# Patient Record
Sex: MALE | Race: BLACK OR AFRICAN AMERICAN
[De-identification: names, ages, dates, MRNs, and addresses within clinical notes are randomized per-mention and may not be internally consistent; named-entity substitution may affect disease eponyms.]

---

## 2017-05-19 ENCOUNTER — HOSPITAL ENCOUNTER (OUTPATIENT)
Dept: HOSPITAL 11 - JP.SDS | Age: 57
Discharge: HOME | End: 2017-05-19
Attending: SURGERY
Payer: MEDICAID

## 2017-05-19 VITALS — SYSTOLIC BLOOD PRESSURE: 131 MMHG | DIASTOLIC BLOOD PRESSURE: 97 MMHG

## 2017-05-19 DIAGNOSIS — Z12.11: Primary | ICD-10-CM

## 2017-05-19 PROCEDURE — 45378 DIAGNOSTIC COLONOSCOPY: CPT

## 2017-05-19 NOTE — OR
DATE OF PROCEDURE:  05/19/2017

 

PREOPERATIVE DIAGNOSIS:  Colon cancer screening.

 

POSTOPERATIVE DIAGNOSIS:  Unremarkable colonoscopy.

 

PROCEDURE:  Colonoscopy to the cecum.

 

ANESTHESIA:  IV anesthesia with monitored anesthesia care.

 

INDICATION:  This 57-year-old black male is referred for a colonoscopy for 
colon cancer

screening.  He has never had a colonoscopic exam.  I counseled him for the 
procedure

including risks and alternatives and he gave his informed consent to proceed.

 

DESCRIPTION OF OPERATION:  The patient was placed in the left lateral decubitus 
position.

IV anesthesia was administered by the Anesthesia Service.  Time-out was held.  
A rectal 

exam was performed, which was unremarkable.  The flexible video Olympus 
colonoscope was

introduced through his anus, up his rectum, and out his colon all way to the 
cecum.  Once

the cecum was reached, the scope was slowly withdrawn examining the mucosa 
throughout.

No mucosal abnormalities were noted.  The scope was retroflexed in the rectum 
with the

distal rectum appearing unremarkable.  The scope was straightened and removed.  
He tolerated

the procedure well.

 

 

 

 

Carlos Salgado MD

DD:  05/19/2017 08:05:14

DT:  05/19/2017 10:45:22

Job #:  [id number]

[facility]

## 2018-06-06 ENCOUNTER — HOSPITAL ENCOUNTER (EMERGENCY)
Dept: HOSPITAL 11 - JP.ED | Age: 58
Discharge: HOME | End: 2018-06-06
Payer: MEDICAID

## 2018-06-06 VITALS — SYSTOLIC BLOOD PRESSURE: 124 MMHG | DIASTOLIC BLOOD PRESSURE: 76 MMHG

## 2018-06-06 DIAGNOSIS — Z79.899: ICD-10-CM

## 2018-06-06 DIAGNOSIS — E87.6: ICD-10-CM

## 2018-06-06 DIAGNOSIS — I20.9: Primary | ICD-10-CM

## 2018-06-06 DIAGNOSIS — Z87.891: ICD-10-CM

## 2018-06-06 DIAGNOSIS — I10: ICD-10-CM

## 2018-06-06 PROCEDURE — 83735 ASSAY OF MAGNESIUM: CPT

## 2018-06-06 PROCEDURE — 84484 ASSAY OF TROPONIN QUANT: CPT

## 2018-06-06 PROCEDURE — 93005 ELECTROCARDIOGRAM TRACING: CPT

## 2018-06-06 PROCEDURE — 36415 COLL VENOUS BLD VENIPUNCTURE: CPT

## 2018-06-06 PROCEDURE — 80048 BASIC METABOLIC PNL TOTAL CA: CPT

## 2018-06-06 PROCEDURE — 99285 EMERGENCY DEPT VISIT HI MDM: CPT

## 2018-06-06 PROCEDURE — 85027 COMPLETE CBC AUTOMATED: CPT

## 2018-06-06 NOTE — EDM.PDOC
ED HPI GENERAL MEDICAL PROBLEM





- General


Chief Complaint: Chest Pain


Stated Complaint: MEDICAL VIA NORTH


Time Seen by Provider: 06/06/18 11:15


Source of Information: Reports: Patient, EMS, Old Records, RN


History Limitations: Reports: No Limitations





- History of Present Illness


INITIAL COMMENTS - FREE TEXT/NARRATIVE: 





57 yo male with known CAD presents after he took NTG for CP and became syncopal 

afterwards. Was sitting at the table when he took the Nitro and when the CP 

came on. His last episode of CP was 2 days ago and occurred while lying in bed, 

at that time he also took NTG in the lying position and did not pass out. He 

arrives via EMS today and they had attempted to walk him out to the ambulance 

upon their arrival and he passed out again with a BP of 80 systolic. 





Now in the ER he is feeling back to his baseline. He has taken all his morning 

meds including his aspirin. 





His cardiologist is in [place], [place]. He has been experiencing increased frequency 

of CP over the past couple of mos. 


Onset: Today


Onset Date: 06/06/18


Onset Time: 10:40


Duration: Minutes:, Resolved Prior to Arrival


Location: Reports: Chest


Quality: Reports: Other (tightness)


Severity: Moderate


Improves with: Reports: Medication


Worsens with: Reports: Other (unknown)


Context: Reports: Other (Has known CAD)


Associated Symptoms: Reports: Syncope (only after taking his NTG).  Denies: 

Diaphoresis, Fever/Chills, Nausea/Vomiting, Shortness of Breath


Treatments PTA: Reports: Other (see below) (NTG)





- Related Data


 Allergies











Allergy/AdvReac Type Severity Reaction Status Date / Time


 


No Known Allergies Allergy   Verified 06/06/18 11:14











Home Meds: 


 Home Meds





Metoprolol Succinate [Toprol XL] 100 mg PO BID 01/17/14 [History]


Nitroglycerin [Nitrostat] 0.4 mg SL ASDIRECTED PRN 01/17/14 [History]


Simvastatin [Zocor] 40 mg PO DAILY 01/17/14 [History]


risperiDONE [RisperiDAL] 0.5 mg PO DAILY 01/17/14 [History]


Mirtazapine [Remeron] 30 mg PO BEDTIME 09/17/14 [History]


Furosemide [Lasix] 40 mg PO DAILY 02/28/15 [History]


Isosorbide Mononitrate [Isosorbide Mononitrate ER] 120 mg PO DAILY 02/28/15 [

History]


LORazepam [Ativan] 0.5 - 1 mg PO DAILY PRN 02/28/15 [History]


Vilazodone [Viibryd] 40 mg PO DAILY 02/28/15 [History]


Aspirin [Children's Aspirin] 81 mg PO DAILY 08/09/16 [History]


Albuterol Sulfate [Proair Hfa] 2 puff IH Q4H PRN 05/18/17 [History]


Carbidopa/Levodopa [Sinemet  mg Tablet] 1 each PO QID 05/18/17 [History]


Eszopiclone [Lunesta] 2 mg PO BEDTIME 05/18/17 [History]


Lisinopril [Prinivil] 0.5 tab PO DAILY 05/18/17 [History]


oxyCODONE HCl [Roxicodone] 5 mg PO Q6H PRN 05/18/17 [History]


Potassium Chloride 10 meq PO TID #20 cap.er 06/06/18 [Rx]











Past Medical History


HEENT History: Reports: Impaired Vision


Other HEENT History: prosthetic eye right


Cardiovascular History: Reports: CAD, High Cholesterol, Hypertension, MI, 

Pacemaker, SOB on Exertion, Stents


Gastrointestinal History: Reports: GERD


Neurological History: Reports: CVA, Parkinson's


Psychiatric History: Reports: Anxiety, Bipolar, Depression, Mood Swings, PTSD, 

Suicide Attempt





- Infectious Disease History


Infectious Disease History: Reports: Chicken Pox, Measles


Other Infectious Disease History: unable to obtain





- Past Surgical History


HEENT Surgical History: Reports: Eye Surgery


Cardiovascular Surgical History: Reports: Coronary Artery Stent, Other (See 

Below)


Other Cardiovascular Surgeries/Procedures: pacer defibrillator


GI Surgical History: Reports: EGD, Hernia, Abdominal





Social & Family History





- Tobacco Use


Smoking Status *Q: Former Smoker


Used Tobacco, but Quit: Yes


Month/Year Tobacco Last Used: 0





- Caffeine Use


Caffeine Use: Reports: None





- Recreational Drug Use


Recreational Drug Use: No





ED ROS GENERAL





- Review of Systems


Review Of Systems: See Below


Constitutional: Reports: No Symptoms


HEENT: Reports: No Symptoms


Respiratory: Reports: No Symptoms


Cardiovascular: Reports: Chest Pain, Lightheadedness, Syncope


Endocrine: Reports: No Symptoms


GI/Abdominal: Reports: No Symptoms


: Reports: No Symptoms


Musculoskeletal: Reports: No Symptoms


Skin: Reports: No Symptoms


Neurological: Reports: No Symptoms


Psychiatric: Reports: No Symptoms





ED EXAM, GENERAL





- Physical Exam


Exam: See Below


Exam Limited By: No Limitations


General Appearance: Alert, WD/WN, No Apparent Distress


Eye Exam: Bilateral Eye: Normal Inspection


Ears: Normal External Exam, Normal Canal, Hearing Grossly Normal


Ear Exam: Bilateral Ear: Auricle Normal, Canal Normal


Nose: Normal Inspection, Normal Mucosa, No Blood


Throat/Mouth: Normal Inspection, Normal Lips, Normal Oropharynx, Normal Voice, 

No Airway Compromise


Head: Atraumatic, Normocephalic


Neck: Normal Inspection, Supple, Non-Tender


Respiratory/Chest: No Respiratory Distress, Lungs Clear, Normal Breath Sounds, 

No Accessory Muscle Use


Cardiovascular: Regular Rate, Rhythm, No Edema


GI/Abdominal: Normal Bowel Sounds, Soft, Non-Tender, No Distention


Back Exam: Normal Inspection


Extremities: Normal Inspection, Normal Range of Motion, Non-Tender, No Pedal 

Edema


Neurological: Alert, Oriented, CN II-XII Intact, Normal Cognition, No Motor/

Sensory Deficits


Psychiatric: Normal Affect, Normal Mood


Skin Exam: Warm, Dry, Intact, Normal Color, No Rash


Lymphatic: No Adenopathy





EKG INTERPRETATION


EKG Date: 06/06/18


Time: 11:10


Rhythm: NSR


Rate (Beats/Min): 75


Axis: Normal


P-Wave: Present


QRS: Normal


ST-T: Normal


QT: Normal


Comparison: No Change





Course





- Vital Signs


Last Recorded V/S: 


 Last Vital Signs











Temp  35.1 C L  06/06/18 11:12


 


Pulse  73   06/06/18 11:43


 


Resp  16   06/06/18 11:43


 


BP  124/76   06/06/18 11:43


 


Pulse Ox  90 L  06/06/18 11:43














- Orders/Labs/Meds


Orders: 


 Active Orders 24 hr











 Category Date Time Status


 


 Cardiac Monitoring [RC] .As Directed Care  06/06/18 11:20 Active


 


 EKG Documentation Completion [RC] ASDIRECTED Care  06/06/18 11:19 Active


 


 EKG 12 Lead [EK] Routine Ther  06/06/18 11:19 Ordered











Labs: 


 Laboratory Tests











  06/06/18 06/06/18 06/06/18 Range/Units





  11:38 11:38 12:27 


 


WBC  7.2    (4.5-11.0)  K/uL


 


RBC  4.31    (4.30-5.90)  M/uL


 


Hgb  13.4    (12.0-15.0)  g/dL


 


Hct  40.9    (40.0-54.0)  %


 


MCV  95    (80-98)  fL


 


MCH  31    (27-31)  pg


 


MCHC  33    (32-36)  %


 


Plt Count  163    (150-400)  K/uL


 


Sodium   140   (140-148)  mmol/L


 


Potassium   3.1 L   (3.6-5.2)  mmol/L


 


Chloride   102   (100-108)  mmol/L


 


Carbon Dioxide   28   (21-32)  mmol/L


 


Anion Gap   13.1   (5.0-14.0)  mmol/L


 


BUN   6 L   (7-18)  mg/dL


 


Creatinine   1.5 H   (0.8-1.3)  mg/dL


 


Est Cr Clr Drug Dosing   49.62   mL/min


 


Estimated GFR (MDRD)   59 L   (>60)  


 


Glucose   127 H   ()  mg/dL


 


Calcium   8.5   (8.5-10.1)  mg/dL


 


Magnesium    1.7 L  (1.8-2.4)  mg/dL


 


Troponin I   0.048   (0.000-0.056)  ng/mL











Meds: 


Medications














Discontinued Medications














Generic Name Dose Route Start Last Admin





  Trade Name Freq  PRN Reason Stop Dose Admin


 


Potassium Chloride  40 meq  06/06/18 12:26  





  Potassium Chloride  PO  06/06/18 12:27  





  ONETIME ONE   





     





     





     





     














Departure





- Departure


Time of Disposition: 13:00


Disposition: Home, Self-Care 01


Condition: Fair


Clinical Impression: 


 Angina at rest, Hypokalemia





Referrals: 


Ezequiel Piña MD [Primary Care Provider] - 


Forms:  ED Department Discharge





- My Orders


Last 24 Hours: 


My Active Orders





06/06/18 11:19


EKG Documentation Completion [RC] ASDIRECTED 


EKG 12 Lead [EK] Routine 





06/06/18 11:20


Cardiac Monitoring [RC] .As Directed 














- Assessment/Plan


Last 24 Hours: 


My Active Orders





06/06/18 11:19


EKG Documentation Completion [RC] ASDIRECTED 


EKG 12 Lead [EK] Routine 





06/06/18 11:20


Cardiac Monitoring [RC] .As Directed

## 2018-08-14 ENCOUNTER — HOSPITAL ENCOUNTER (EMERGENCY)
Dept: HOSPITAL 11 - JP.ED | Age: 58
Discharge: HOME | End: 2018-08-14
Payer: MEDICAID

## 2018-08-14 VITALS — SYSTOLIC BLOOD PRESSURE: 98 MMHG | DIASTOLIC BLOOD PRESSURE: 64 MMHG

## 2018-08-14 DIAGNOSIS — I10: ICD-10-CM

## 2018-08-14 DIAGNOSIS — F41.9: ICD-10-CM

## 2018-08-14 DIAGNOSIS — Z79.899: ICD-10-CM

## 2018-08-14 DIAGNOSIS — R40.0: Primary | ICD-10-CM

## 2018-08-14 NOTE — EDM.PDOC
ED HPI GENERAL MEDICAL PROBLEM





- General


Chief Complaint: General


Stated Complaint: OFF BALANCE, LIGHT HEADED


Time Seen by Provider: 08/14/18 16:50


Source of Information: Reports: Patient, Family


History Limitations: Reports: Altered Mental Status, Physical Impairment





- History of Present Illness


INITIAL COMMENTS - FREE TEXT/NARRATIVE: 





58-year-old male for the past 2 hours has been very sleepy, lightheaded, feels 

his balance is off so his family brought him in to be evaluated. He walks but 

has to be helped. When you don't talk to him he tends to fall asleep. He does 

wake up to voice, answers questions appropriately. Denies any pain or shortness 

of breath. Denies nausea or vomiting or recent illness, no fevers or chills. 

There were some concerns that there may have been some medication errors but 

now they're saying that it looks like his medications are taken appropriately.





- Related Data


 Allergies











Allergy/AdvReac Type Severity Reaction Status Date / Time


 


No Known Allergies Allergy   Verified 08/14/18 16:42











Home Meds: 


 Home Meds





Mirtazapine [Remeron] 15 mg PO BEDTIME 09/17/14 [History]


Isosorbide Mononitrate [Isosorbide Mononitrate ER] 120 mg PO DAILY 02/28/15 [

History]


Carbidopa/Levodopa [Sinemet  mg Tablet] 1 tab PO QID 05/18/17 [History]


Prazosin [Minpress] 2 mg PO BID 06/20/18 [History]


QUEtiapine [SEROquel XR] 200 mg PO BEDTIME 06/20/18 [History]


atorvaSTATin [Lipitor] 40 mg PO DAILY 06/20/18 [History]


clonazePAM [Klonopin] 1 mg PO BEDTIME 06/20/18 [History]


Calcium Carbonate [Calcium] 1,200 mg PO DAILY 08/14/18 [History]


Meclizine [Antivert] 1 tab PO TID PRN 08/14/18 [History]


clonazePAM [Klonopin] 1 tab PO DAILY PRN 08/14/18 [History]


lamoTRIgine 1 tab PO DAILY 08/14/18 [History]











Past Medical History


HEENT History: Reports: Impaired Vision


Other HEENT History: prosthetic eye right


Cardiovascular History: Reports: CAD, High Cholesterol, Hypertension, MI, 

Pacemaker, SOB on Exertion, Stents


Gastrointestinal History: Reports: GERD


Neurological History: Reports: CVA, Parkinson's


Psychiatric History: Reports: Anxiety, Bipolar, Depression, Mood Swings, PTSD, 

Suicide Attempt





- Infectious Disease History


Infectious Disease History: Reports: Chicken Pox, Measles


Other Infectious Disease History: unable to obtain





- Past Surgical History


HEENT Surgical History: Reports: Eye Surgery


Cardiovascular Surgical History: Reports: Coronary Artery Stent, Other (See 

Below)


Other Cardiovascular Surgeries/Procedures: pacer defibrillator


GI Surgical History: Reports: EGD, Hernia, Abdominal





Social & Family History





- Tobacco Use


Smoking Status *Q: Never Smoker





- Caffeine Use


Caffeine Use: Reports: None





- Recreational Drug Use


Recreational Drug Use: No





ED ROS GENERAL





- Review of Systems


Review Of Systems: See Below


Constitutional: Denies: Fever, Chills, Malaise


HEENT: Reports: Other (Patient has a prosthetic eye, no new visual problems)


Respiratory: Denies: Shortness of Breath, Cough


Cardiovascular: Denies: Chest Pain, Palpitations


Endocrine: Reports: Fatigue


GI/Abdominal: Denies: Abdominal Pain, Nausea, Vomiting


: Reports: No Symptoms


Neurological: Reports: Dizziness, Difficulty Walking, Weakness.  Denies: 

Headache





ED EXAM, GENERAL





- Physical Exam


Exam: See Below


Exam Limited By: No Limitations


General Appearance: Lethargic (Patient is acting very tired but is arousable 

and answering questions)


Eye Exam: Bilateral Eye: PERRL (His non-prosthetic eye is reactive to light)


Ear Exam: Bilateral Ear: TM normal


Head: Atraumatic, Other


Respiratory/Chest: No Respiratory Distress, Lungs Clear


Cardiovascular: Regular Rate, Rhythm


GI/Abdominal: Soft, Tender (Somewhat generally tender to palpation of the 

abdomen, no focal guarding or rebound)


Neurological: Oriented (Patient is oriented to time and place)


Psychiatric: Depressed Mood, Flat Affect


Skin Exam: Warm, Dry





Course





- Vital Signs


Last Recorded V/S: 


 Last Vital Signs











Temp  96.8 F   08/14/18 16:41


 


Pulse  84   08/14/18 17:37


 


Resp  16   08/14/18 16:41


 


BP  98/64   08/14/18 17:37


 


Pulse Ox  92 L  08/14/18 17:37














- Orders/Labs/Meds


Orders: 


 Active Orders 24 hr











 Category Date Time Status


 


 Head wo Cont [CT] Stat Exams  08/14/18 17:08 Taken


 


 DRUG SCREEN, URINE [URCHEM] Stat Lab  08/14/18 17:52 Ordered


 


 UA W/MICROSCOPIC [URIN] Urgent Lab  08/14/18 17:52 Ordered











Labs: 


 Laboratory Tests











  08/14/18 08/14/18 08/14/18 Range/Units





  17:08 17:08 17:52 


 


WBC  6.4    (4.5-11.0)  K/uL


 


RBC  4.23 L    (4.30-5.90)  M/uL


 


Hgb  12.8    (12.0-15.0)  g/dL


 


Hct  39.4 L    (40.0-54.0)  %


 


MCV  93    (80-98)  fL


 


MCH  30    (27-31)  pg


 


MCHC  33    (32-36)  %


 


Plt Count  169    (150-400)  K/uL


 


Neut % (Auto)  61    (36-66)  %


 


Lymph % (Auto)  27    (24-44)  %


 


Mono % (Auto)  8 H    (2-6)  %


 


Eos % (Auto)  4    (2-4)  %


 


Baso % (Auto)  0    (0-1)  %


 


Sodium   141   (140-148)  mmol/L


 


Potassium   3.4 L   (3.6-5.2)  mmol/L


 


Chloride   105   (100-108)  mmol/L


 


Carbon Dioxide   30   (21-32)  mmol/L


 


Anion Gap   9.4   (5.0-14.0)  mmol/L


 


BUN   11  D   (7-18)  mg/dL


 


Creatinine   1.3   (0.8-1.3)  mg/dL


 


Est Cr Clr Drug Dosing   57.26   mL/min


 


Estimated GFR (MDRD)   > 60   (>60)  


 


Glucose   111 H   ()  mg/dL


 


Calcium   9.0   (8.5-10.1)  mg/dL


 


Total Bilirubin   0.8   (0.2-1.0)  mg/dL


 


AST   22   (15-37)  U/L


 


ALT   17   (12-78)  U/L


 


Alkaline Phosphatase   119 H   ()  U/L


 


Total Protein   7.1   (6.4-8.2)  g/dL


 


Albumin   3.4   (3.4-5.0)  g/dL


 


Globulin   3.7 H   (2.3-3.5)  g/dL


 


Albumin/Globulin Ratio   0.9 L   (1.2-2.2)  


 


Urine Color    Orange  


 


Urine Appearance    Clear  


 


Urine pH    5.0  (4.5-8.0)  


 


Ur Specific Gravity    1.030  (1.008-1.030)  


 


Urine Protein    Negative  (NEGATIVE)  mg/dL


 


Urine Glucose (UA)    Normal  (NEGATIVE)  mg/dL


 


Urine Ketones    Negative  (NEGATIVE)  mg/dL


 


Urine Occult Blood    Negative  (NEGATIVE)  


 


Urine Nitrite    Negative  (NEGAITVE)  


 


Urine Bilirubin    Negative  (NEGATIVE)  


 


Urine Urobilinogen    Normal  (NORMAL)  mg/dL


 


Ur Leukocyte Esterase    Negative  (NEGATIVE)  


 


Urine RBC    0-5  (0-5)  


 


Urine WBC    0-5  (0-5)  


 


Ur Epithelial Cells    Not seen  


 


Amorphous Sediment    Few  


 


Urine Bacteria    Not seen  


 


Urine Mucus    Not seen  


 


Urine Opiates Screen     (NEGATIVE)  


 


Ur Oxycodone Screen     (NEGATIVE)  


 


Urine Methadone Screen     (NEGATIVE)  


 


Ur Propoxyphene Screen     (NEGATIVE)  


 


Ur Barbiturates Screen     (NEGATIVE)  


 


Ur Tricyclics Screen     (NEGATIVE)  


 


Ur Phencyclidine Scrn     (NEGATIVE)  


 


Ur Amphetamine Screen     (NEGATIVE)  


 


U Methamphetamines Scrn     (NEGATIVE)  


 


Urine MDMA Screen     (NEGATIVE)  


 


U Benzodiazepines Scrn     (NEGATIVE)  


 


U Cocaine Metab Screen     (NEGATIVE)  


 


U Marijuana (THC) Screen     (NEGATIVE)  














  08/14/18 Range/Units





  17:52 


 


WBC   (4.5-11.0)  K/uL


 


RBC   (4.30-5.90)  M/uL


 


Hgb   (12.0-15.0)  g/dL


 


Hct   (40.0-54.0)  %


 


MCV   (80-98)  fL


 


MCH   (27-31)  pg


 


MCHC   (32-36)  %


 


Plt Count   (150-400)  K/uL


 


Neut % (Auto)   (36-66)  %


 


Lymph % (Auto)   (24-44)  %


 


Mono % (Auto)   (2-6)  %


 


Eos % (Auto)   (2-4)  %


 


Baso % (Auto)   (0-1)  %


 


Sodium   (140-148)  mmol/L


 


Potassium   (3.6-5.2)  mmol/L


 


Chloride   (100-108)  mmol/L


 


Carbon Dioxide   (21-32)  mmol/L


 


Anion Gap   (5.0-14.0)  mmol/L


 


BUN   (7-18)  mg/dL


 


Creatinine   (0.8-1.3)  mg/dL


 


Est Cr Clr Drug Dosing   mL/min


 


Estimated GFR (MDRD)   (>60)  


 


Glucose   ()  mg/dL


 


Calcium   (8.5-10.1)  mg/dL


 


Total Bilirubin   (0.2-1.0)  mg/dL


 


AST   (15-37)  U/L


 


ALT   (12-78)  U/L


 


Alkaline Phosphatase   ()  U/L


 


Total Protein   (6.4-8.2)  g/dL


 


Albumin   (3.4-5.0)  g/dL


 


Globulin   (2.3-3.5)  g/dL


 


Albumin/Globulin Ratio   (1.2-2.2)  


 


Urine Color   


 


Urine Appearance   


 


Urine pH   (4.5-8.0)  


 


Ur Specific Gravity   (1.008-1.030)  


 


Urine Protein   (NEGATIVE)  mg/dL


 


Urine Glucose (UA)   (NEGATIVE)  mg/dL


 


Urine Ketones   (NEGATIVE)  mg/dL


 


Urine Occult Blood   (NEGATIVE)  


 


Urine Nitrite   (NEGAITVE)  


 


Urine Bilirubin   (NEGATIVE)  


 


Urine Urobilinogen   (NORMAL)  mg/dL


 


Ur Leukocyte Esterase   (NEGATIVE)  


 


Urine RBC   (0-5)  


 


Urine WBC   (0-5)  


 


Ur Epithelial Cells   


 


Amorphous Sediment   


 


Urine Bacteria   


 


Urine Mucus   


 


Urine Opiates Screen  Negative  (NEGATIVE)  


 


Ur Oxycodone Screen  Negative  (NEGATIVE)  


 


Urine Methadone Screen  Negative  (NEGATIVE)  


 


Ur Propoxyphene Screen  Negative  (NEGATIVE)  


 


Ur Barbiturates Screen  Negative  (NEGATIVE)  


 


Ur Tricyclics Screen  Presumptive positive H  (NEGATIVE)  


 


Ur Phencyclidine Scrn  Negative  (NEGATIVE)  


 


Ur Amphetamine Screen  Negative  (NEGATIVE)  


 


U Methamphetamines Scrn  Negative  (NEGATIVE)  


 


Urine MDMA Screen  Negative  (NEGATIVE)  


 


U Benzodiazepines Scrn  Negative  (NEGATIVE)  


 


U Cocaine Metab Screen  Negative  (NEGATIVE)  


 


U Marijuana (THC) Screen  Negative  (NEGATIVE)  














- Re-Assessments/Exams


Free Text/Narrative Re-Assessment/Exam: 





08/14/18 18:15


CBC and CMP were obtained and were reassuring. Head CT was obtained and was 

also normal. Had a long discussion with the family, his mental status continued 

to markedly improve while he was here. I think is a combination medication side 

effects with overlying anxiety or conversion reaction.


08/14/18 18:16


UA returned negative, and drug screen was negative as well. Patient has 

appointment with his psychiatrist tomorrow, possibly reducing the Seroquel 

doses may be helpful.





Departure





- Departure


Time of Disposition: 18:33


Disposition: Home, Self-Care 01


Condition: Fair


Clinical Impression: 


 Anxiety





Change in mental status


Qualifiers:


 Altered mental status type: somnolence Qualified Code(s): R40.0 - Somnolence








- Discharge Information


Instructions:  Weakness


Referrals: 


Ezequiel Piña MD [Primary Care Provider] - 


Forms:  ED Department Discharge


Care Plan Goals: 


Keep appointment with your psychiatrist tomorrow to discuss medications. 

Recheck tomorrow at the ER or with your medical doctor if not improving 

satisfactorily.





- My Orders


Last 24 Hours: 


My Active Orders





08/14/18 17:08


Head wo Cont [CT] Stat 





08/14/18 17:52


DRUG SCREEN, URINE [URCHEM] Stat 


UA W/MICROSCOPIC [URIN] Urgent 














- Assessment/Plan


Last 24 Hours: 


My Active Orders





08/14/18 17:08


Head wo Cont [CT] Stat 





08/14/18 17:52


DRUG SCREEN, URINE [URCHEM] Stat 


UA W/MICROSCOPIC [URIN] Urgent

## 2018-08-15 ENCOUNTER — HOSPITAL ENCOUNTER (EMERGENCY)
Dept: HOSPITAL 11 - JP.ED | Age: 58
Discharge: HOME | End: 2018-08-15
Payer: MEDICAID

## 2018-08-15 VITALS — SYSTOLIC BLOOD PRESSURE: 124 MMHG | DIASTOLIC BLOOD PRESSURE: 81 MMHG

## 2018-08-15 DIAGNOSIS — R41.0: Primary | ICD-10-CM

## 2018-08-15 DIAGNOSIS — Z79.899: ICD-10-CM

## 2018-08-15 DIAGNOSIS — I10: ICD-10-CM

## 2018-08-15 NOTE — EDM.PDOC
ED HPI GENERAL MEDICAL PROBLEM





- General


Chief Complaint: General


Stated Complaint: ILLNESS


Time Seen by Provider: 08/15/18 17:19


Source of Information: Reports: Patient, Family, Old Records, RN Notes Reviewed


History Limitations: Reports: No Limitations





- History of Present Illness


INITIAL COMMENTS - FREE TEXT/NARRATIVE: 





58-year-old gentleman presents emergency department today with the same 

complaints that he had yesterday which include somnolence, dizziness, 

difficulty with concentration, shakiness difficulty with speech difficulty 

swallowing, blurred vision fatigue and irritability, he had an extensive workup 

yesterday blood work and CAT scan which did not reveal any concern the 

attending physician felt it may be related to his Seroquel, he was to have an 

appointment today with his mental health provider unfortunately the clinic 

canceled the appointment and it is rescheduled for tomorrow.





- Related Data


 Allergies











Allergy/AdvReac Type Severity Reaction Status Date / Time


 


No Known Allergies Allergy   Verified 08/15/18 17:11











Home Meds: 


 Home Meds





Mirtazapine [Remeron] 15 mg PO BEDTIME 09/17/14 [History]


Isosorbide Mononitrate [Isosorbide Mononitrate ER] 120 mg PO DAILY 02/28/15 [

History]


Carbidopa/Levodopa [Sinemet  mg Tablet] 1 tab PO QID 05/18/17 [History]


Prazosin [Minpress] 2 mg PO BID 06/20/18 [History]


QUEtiapine [SEROquel XR] 200 mg PO BEDTIME 06/20/18 [History]


atorvaSTATin [Lipitor] 40 mg PO DAILY 06/20/18 [History]


clonazePAM [Klonopin] 1 mg PO BEDTIME 06/20/18 [History]


Calcium Carbonate [Calcium] 1,200 mg PO DAILY 08/14/18 [History]


Meclizine [Antivert] 1 tab PO TID PRN 08/14/18 [History]


clonazePAM [Klonopin] 1 tab PO DAILY PRN 08/14/18 [History]


lamoTRIgine 1 tab PO DAILY 08/14/18 [History]











Past Medical History


HEENT History: Reports: Impaired Vision


Other HEENT History: prosthetic eye right


Cardiovascular History: Reports: CAD, High Cholesterol, Hypertension, MI, 

Pacemaker, SOB on Exertion, Stents


Gastrointestinal History: Reports: GERD


Neurological History: Reports: CVA, Parkinson's


Psychiatric History: Reports: Anxiety, Bipolar, Depression, Mood Swings, PTSD, 

Suicide Attempt





- Infectious Disease History


Infectious Disease History: Reports: Chicken Pox


Other Infectious Disease History: unable to obtain





- Past Surgical History


HEENT Surgical History: Reports: Eye Surgery


Cardiovascular Surgical History: Reports: Coronary Artery Stent, Other (See 

Below)


Other Cardiovascular Surgeries/Procedures: pacer defibrillator


GI Surgical History: Reports: EGD, Hernia, Abdominal





Social & Family History





- Tobacco Use


Smoking Status *Q: Never Smoker





- Caffeine Use


Caffeine Use: Reports: None





- Recreational Drug Use


Recreational Drug Use: No





ED ROS GENERAL





- Review of Systems


Review Of Systems: See Below


Constitutional: Reports: Weakness, Fatigue


HEENT: Reports: Vertigo, Vision Change


Respiratory: Reports: No Symptoms


Cardiovascular: Reports: No Symptoms


GI/Abdominal: Reports: No Symptoms


: Reports: No Symptoms


Musculoskeletal: Reports: No Symptoms


Skin: Reports: No Symptoms


Neurological: Reports: Confusion, Dizziness





ED EXAM, GENERAL





- Physical Exam


Exam: See Below


Exam Limited By: No Limitations


General Appearance: Alert, WD/WN, No Apparent Distress


Respiratory/Chest: No Respiratory Distress, Lungs Clear, Normal Breath Sounds, 

No Accessory Muscle Use


Cardiovascular: Regular Rate, Rhythm, No Murmur





Course





- Vital Signs


Last Recorded V/S: 





 Last Vital Signs











Temp  95.6 F   08/15/18 16:59


 


Pulse  84   08/15/18 16:59


 


Resp  13   08/15/18 16:59


 


BP  124/81   08/15/18 16:59


 


Pulse Ox  94 L  08/15/18 16:59














Departure





- Departure


Time of Disposition: 17:37


Disposition: Home, Self-Care 01


Condition: Fair


Clinical Impression: 


Change in mental status


Qualifiers:


 Altered mental status type: stupor Qualified Code(s): R40.1 - Stupor








- Discharge Information


Referrals: 


Ezequiel Piña MD [Primary Care Provider] - 


Additional Instructions: 


Reduce her Seroquel to 100 mg at night, please follow-up with your mental 

health provider tomorrow, call return to the emergency department worsening of 

symptoms





- Assessment/Plan


Plan: 





Assessment





Acuity = acute





Site and laterality = confusion difficulty with concentration  





Etiology  = probably related to Seroquel  





Manifestations = none  





Location of injury =  Home





Lab values = none  





Plan


Elected to reduce his Seroquel 200 mg at night remain on the 25 mg by mouth 

twice a day he does have follow-up with his mental health provider tomorrow  

















 This note was dictated using dragon voice recognition software please call 

with any questions on syntax or grammar.

## 2018-08-19 ENCOUNTER — HOSPITAL ENCOUNTER (EMERGENCY)
Dept: HOSPITAL 11 - JP.ED | Age: 58
Discharge: HOME | End: 2018-08-19
Payer: MEDICAID

## 2018-08-19 VITALS — DIASTOLIC BLOOD PRESSURE: 83 MMHG | SYSTOLIC BLOOD PRESSURE: 119 MMHG

## 2018-08-19 DIAGNOSIS — I10: ICD-10-CM

## 2018-08-19 DIAGNOSIS — I20.9: Primary | ICD-10-CM

## 2018-08-19 PROCEDURE — 93005 ELECTROCARDIOGRAM TRACING: CPT

## 2018-08-19 PROCEDURE — 99285 EMERGENCY DEPT VISIT HI MDM: CPT

## 2018-08-19 PROCEDURE — 36415 COLL VENOUS BLD VENIPUNCTURE: CPT

## 2018-08-19 PROCEDURE — 84484 ASSAY OF TROPONIN QUANT: CPT

## 2018-08-19 NOTE — EDM.PDOC
ED HPI GENERAL MEDICAL PROBLEM





- General


Chief Complaint: Chest Pain


Stated Complaint: CHEST PAIN


Time Seen by Provider: 08/19/18 10:16


Source of Information: Reports: Patient, Family, Old Records, RN


History Limitations: Reports: No Limitations





- History of Present Illness


INITIAL COMMENTS - FREE TEXT/NARRATIVE: 





59 yo male with known CAD presents with intermittent chest pains x 4 days that 

goes away with NTG. Has not called to discuss or seen either his cardiologist 

or his family doctor. Gets SOB and diaphoresis at times with these pains. He 

took NTG x 1 at home today for chest pain and is now asymptomatic on arrival. 

He has not noted a correlation with eating or exertion. 





Has not taken any of his morning medications yet today.





Duration of pain today about 5-6 minutes, not any worse than other episodes 

over the past 4 days.





Has an appt tomorrow in [place] with his cardiologist.


Onset: Gradual


Onset Date: 08/15/18


Duration: Day(s): (4), Intermittent, Waxing/Waning


Location: Reports: Chest


Quality: Reports: Other ("feels like someone is squeezing my heart")


Severity: Moderate


Improves with: Reports: Medication


Worsens with: Reports: Other (unknown)


Context: Reports: Other (has known CAD)


Associated Symptoms: Reports: Chest Pain, Diaphoresis, Shortness of Breath


Treatments PTA: Reports: Other (see below) (NTG SL x 1)


  ** Chest


Pain Score (Numeric/FACES): 4





- Related Data


 Allergies











Allergy/AdvReac Type Severity Reaction Status Date / Time


 


No Known Allergies Allergy   Verified 08/19/18 10:15











Home Meds: 


 Home Meds





Mirtazapine [Remeron] 15 mg PO BEDTIME 09/17/14 [History]


Isosorbide Mononitrate [Isosorbide Mononitrate ER] 120 mg PO DAILY 02/28/15 [

History]


Carbidopa/Levodopa [Sinemet  mg Tablet] 1 tab PO QID 05/18/17 [History]


Prazosin [Minpress] 2 mg PO BID 06/20/18 [History]


QUEtiapine [SEROquel XR] 100 mg PO BEDTIME 06/20/18 [History]


atorvaSTATin [Lipitor] 40 mg PO DAILY 06/20/18 [History]


clonazePAM [Klonopin] 1 mg PO BEDTIME 06/20/18 [History]


Calcium Carbonate [Calcium] 1,200 mg PO DAILY 08/14/18 [History]


Meclizine [Antivert] 1 tab PO TID PRN 08/14/18 [History]


clonazePAM [Klonopin] 1 tab PO DAILY PRN 08/14/18 [History]


lamoTRIgine 1 tab PO DAILY 08/14/18 [History]


QUEtiapine [SEROquel] 25 mg PO DAILY 08/19/18 [History]











Past Medical History


HEENT History: Reports: Impaired Vision


Other HEENT History: prosthetic eye right


Cardiovascular History: Reports: CAD, High Cholesterol, Hypertension, MI, 

Pacemaker, SOB on Exertion, Stents


Gastrointestinal History: Reports: GERD


Neurological History: Reports: CVA, Parkinson's


Psychiatric History: Reports: Anxiety, Bipolar, Depression, Mood Swings, PTSD, 

Suicide Attempt





- Infectious Disease History


Infectious Disease History: Reports: Chicken Pox


Other Infectious Disease History: unable to obtain





- Past Surgical History


HEENT Surgical History: Reports: Eye Surgery


Cardiovascular Surgical History: Reports: Coronary Artery Stent, Other (See 

Below)


Other Cardiovascular Surgeries/Procedures: pacer defibrillator


GI Surgical History: Reports: EGD, Hernia, Abdominal





Social & Family History





- Caffeine Use


Caffeine Use: Reports: None





ED ROS GENERAL





- Review of Systems


Review Of Systems: See Below


Constitutional: Reports: Diaphoresis (with pain at times)


HEENT: Reports: No Symptoms


Respiratory: Reports: Shortness of Breath (with pain at times)


Cardiovascular: Reports: Chest Pain


GI/Abdominal: Reports: No Symptoms


: Reports: No Symptoms


Musculoskeletal: Reports: No Symptoms


Skin: Reports: Diaphoresis (at times with pain)


Neurological: Reports: No Symptoms





ED EXAM, GENERAL





- Physical Exam


Exam: See Below


Exam Limited By: Uncooperative


General Appearance: WD/WN, No Apparent Distress


Eye Exam: Bilateral Eye: Normal Inspection


Ears: Normal External Exam, Normal Canal, Hearing Grossly Normal


Ear Exam: Bilateral Ear: Auricle Normal, Canal Normal


Nose: Normal Inspection, Normal Mucosa, No Blood


Throat/Mouth: Normal Inspection, Normal Lips, Normal Oropharynx, Normal Voice, 

No Airway Compromise


Head: Atraumatic, Normocephalic


Neck: Normal Inspection


Respiratory/Chest: No Respiratory Distress, Lungs Clear, Normal Breath Sounds, 

No Accessory Muscle Use


Cardiovascular: Regular Rate, Rhythm, No Edema


GI/Abdominal: Normal Bowel Sounds, Soft, Non-Tender, No Distention


Back Exam: Normal Inspection.  No: CVA Tenderness (R), CVA Tenderness (L)


Extremities: Normal Inspection, Normal Range of Motion, Non-Tender, No Pedal 

Edema


Neurological: Alert, Oriented, CN II-XII Intact, Normal Cognition, No Motor/

Sensory Deficits


Psychiatric: Normal Affect, Normal Mood


Skin Exam: Warm, Dry, Intact, Normal Color, No Rash


Lymphatic: No Adenopathy





EKG INTERPRETATION


EKG Date: 08/19/18


Time: 10:00


Rhythm: NSR


Rate (Beats/Min): 77


Axis: Normal


P-Wave: Present


QRS: Normal


ST-T: Normal


QT: Normal


Comparison: No Change





Course





- Vital Signs


Last Recorded V/S: 


 Last Vital Signs











Temp  36.4 C   08/19/18 10:08


 


Pulse  80   08/19/18 10:08


 


Resp  15   08/19/18 10:08


 


BP  116/77   08/19/18 10:08


 


Pulse Ox  100   08/19/18 10:08














- Orders/Labs/Meds


Orders: 


 Active Orders 24 hr











 Category Date Time Status


 


 Cardiac Monitoring [RC] .As Directed Care  08/19/18 10:13 Active


 


 EKG Documentation Completion [RC] ASDIRECTED Care  08/19/18 10:13 Active


 


 Sodium Chloride 0.9% [Saline Flush] Med  08/19/18 10:26 Active





 10 ml FLUSH ASDIRECTED PRN   


 


 Saline Lock Insert [OM.PC] Routine Oth  08/19/18 10:26 Ordered


 


 EKG 12 Lead [EK] Routine Ther  08/19/18 10:13 Ordered








 Medication Orders





Sodium Chloride (Saline Flush)  10 ml FLUSH ASDIRECTED PRN


   PRN Reason: Keep Vein Open








Labs: 


 Laboratory Tests











  08/19/18 Range/Units





  10:21 


 


Troponin I  0.045  (0.000-0.056)  ng/mL











Meds: 


Medications











Generic Name Dose Route Start Last Admin





  Trade Name Freq  PRN Reason Stop Dose Admin


 


Sodium Chloride  10 ml  08/19/18 10:26  





  Saline Flush  FLUSH   





  ASDIRECTED PRN   





  Keep Vein Open   





     





     





     














Discontinued Medications














Generic Name Dose Route Start Last Admin





  Trade Name Freq  PRN Reason Stop Dose Admin


 


Aspirin  324 mg  08/19/18 10:27  





  Aspirin  PO  08/19/18 10:28  





  ONETIME ONE   





     





     





     





     














Departure





- Departure


Time of Disposition: 11:25


Disposition: Home, Self-Care 01


Condition: Fair


Clinical Impression: 


 Angina at rest





Instructions:  Angina Pectoris


Referrals: 


Ezequiel Piña MD [Primary Care Provider] - 


Forms:  ED Department Discharge


Additional Instructions: 


Add metoprolol tartrate 25 mg every 12 hrs to your current medications. Return 

here if your bouts of chest pain are not relieved by NTG. Keep your appt for 

tomorrow with your doctor. 





- My Orders


Last 24 Hours: 


My Active Orders





08/19/18 10:13


Cardiac Monitoring [RC] .As Directed 


EKG Documentation Completion [RC] ASDIRECTED 


EKG 12 Lead [EK] Routine 





08/19/18 10:26


Sodium Chloride 0.9% [Saline Flush]   10 ml FLUSH ASDIRECTED PRN 


Saline Lock Insert [OM.PC] Routine 














- Assessment/Plan


Last 24 Hours: 


My Active Orders





08/19/18 10:13


Cardiac Monitoring [RC] .As Directed 


EKG Documentation Completion [RC] ASDIRECTED 


EKG 12 Lead [EK] Routine 





08/19/18 10:26


Sodium Chloride 0.9% [Saline Flush]   10 ml FLUSH ASDIRECTED PRN 


Saline Lock Insert [OM.PC] Routine

## 2019-01-21 ENCOUNTER — HOSPITAL ENCOUNTER (EMERGENCY)
Dept: HOSPITAL 11 - JP.ED | Age: 59
Discharge: HOME | End: 2019-01-21
Payer: MEDICAID

## 2019-01-21 VITALS — SYSTOLIC BLOOD PRESSURE: 153 MMHG | DIASTOLIC BLOOD PRESSURE: 65 MMHG

## 2019-01-21 DIAGNOSIS — I10: ICD-10-CM

## 2019-01-21 DIAGNOSIS — M62.838: ICD-10-CM

## 2019-01-21 DIAGNOSIS — I25.2: ICD-10-CM

## 2019-01-21 DIAGNOSIS — E87.6: Primary | ICD-10-CM

## 2019-01-21 DIAGNOSIS — Z79.899: ICD-10-CM

## 2019-01-21 PROCEDURE — 85025 COMPLETE CBC W/AUTO DIFF WBC: CPT

## 2019-01-21 PROCEDURE — 80053 COMPREHEN METABOLIC PANEL: CPT

## 2019-01-21 PROCEDURE — 36415 COLL VENOUS BLD VENIPUNCTURE: CPT

## 2019-01-21 PROCEDURE — 99285 EMERGENCY DEPT VISIT HI MDM: CPT

## 2019-01-21 NOTE — EDM.PDOC
ED HPI GENERAL MEDICAL PROBLEM





- General


Chief Complaint: General


Stated Complaint: SOB


Time Seen by Provider: 01/21/19 06:16


Source of Information: Reports: Patient


History Limitations: Reports: No Limitations





- History of Present Illness


INITIAL COMMENTS - FREE TEXT/NARRATIVE: 





pt had 3 episodes where his whole body would stiffen out. He states he was not 

upset about [place]. When the first responders got there they 

thought he was hyperventilating. The pt states he was not hyperventilating  at 

the time.  We did  try to get a urine and he  refused.


Onset: Today, Sudden


Duration: Hour(s):, Other (pt had 3 episodes. )


Location: Reports: Generalized, Other ( this evidently involved his entire 

body.  His girlfriend did not think this looked like a seizure. )


  ** denies


Pain Score (Numeric/FACES): 0





- Related Data


 Allergies











Allergy/AdvReac Type Severity Reaction Status Date / Time


 


No Known Allergies Allergy   Verified 01/21/19 05:39











Home Meds: 


 Home Meds





Mirtazapine [Remeron] 15 mg PO BEDTIME 09/17/14 [History]


Isosorbide Mononitrate [Isosorbide Mononitrate ER] 120 mg PO DAILY 02/28/15 [

History]


Prazosin [Minpress] 2 mg PO BID 06/20/18 [History]


QUEtiapine [SEROquel XR] 300 mg PO BEDTIME 06/20/18 [History]


lamoTRIgine 1 tab PO DAILY 08/14/18 [History]


QUEtiapine [SEROquel] 25 mg PO DAILY 08/19/18 [History]


Albuterol Sulfate 2 puff IH QID 11/09/18 [History]


Albuterol Sulfate 3 ml IH Q4HR PRN 11/09/18 [History]


Aspirin [Halfprin] 81 mg PO DAILY 11/09/18 [History]


Furosemide 40 mg PO DAILY 11/09/18 [History]


Lisinopril 2.5 mg PO DAILY 11/09/18 [History]


Metoprolol Succinate 100 mg PO BID 11/09/18 [History]


Nitroglycerin 0.4 mg PO ASDIRECTED PRN 11/09/18 [History]


Vilazodone [Viibryd] 40 mg PO DAILY 11/09/18 [History]


atorvaSTATin Calcium [Atorvastatin Calcium] 40 mg PO DAILY 11/09/18 [History]


Carbidopa/Levodopa [Carbidopa-Levodopa  Tab] 1 tab PO QID 12/22/18 [

History]


Gabapentin [Neurontin] 2 tab PO BEDTIME 12/22/18 [History]


Meclizine HCl 25 mg PO DAILY 01/03/19 [History]


clonazePAM [Klonopin] 1 mg PO BEDTIME 01/03/19 [History]











Past Medical History


HEENT History: Reports: Impaired Vision


Other HEENT History: prosthetic eye right


Cardiovascular History: Reports: CAD, High Cholesterol, Hypertension, MI, 

Pacemaker, SOB on Exertion, Stents


Gastrointestinal History: Reports: GERD


Neurological History: Reports: CVA, Parkinson's


Psychiatric History: Reports: Anxiety, Bipolar, Depression, Mood Swings, PTSD, 

Suicide Attempt





- Infectious Disease History


Infectious Disease History: Reports: Chicken Pox


Other Infectious Disease History: unable to obtain





- Past Surgical History


HEENT Surgical History: Reports: Eye Surgery


Cardiovascular Surgical History: Reports: AICD, Coronary Artery Stent, Other (

See Below)


Other Cardiovascular Surgeries/Procedures: pacer defibrillator


GI Surgical History: Reports: EGD, Hernia, Abdominal





Social & Family History





- Tobacco Use


Smoking Status *Q: Never Smoker


Second Hand Smoke Exposure: No





- Caffeine Use


Caffeine Use: Reports: Tea





- Recreational Drug Use


Recreational Drug Use: No





ED ROS GENERAL





- Review of Systems


Review Of Systems: See Below


Constitutional: Reports: Other (pt had episodes where his entire body stiffened 

out. )


HEENT: Reports: No Symptoms


Respiratory: Reports: Other ( When the first responders came he was breathing 

very rapidly )


Cardiovascular: Reports: No Symptoms


Endocrine: Reports: No Symptoms


GI/Abdominal: Reports: No Symptoms


: Reports: No Symptoms


Musculoskeletal: Reports: Muscle Stiffness, Other ( He stiffen out and his 

whole body was briefly rigid. )


Skin: Reports: No Symptoms





ED EXAM, GENERAL





- Physical Exam


Exam: See Below


Free Text/Narrative:: 





Pt arrived with a history of having 3 episodes where his entire body stiffened 

out briefly. He was alert at the time and this did not appear to be a seizure. 


Exam Limited By: No Limitations


General Appearance: Alert, No Apparent Distress, Anxious, Other (pt is very 

concerned what may have caused this. pupils  left is reactive. rt is a artifial 

eye. )


Ears: Normal TMs


Nose: Normal Inspection


Throat/Mouth: Normal Inspection


Head: Atraumatic


Neck: Normal Inspection


Respiratory/Chest: No Respiratory Distress


Cardiovascular: Regular Rate, Rhythm


GI/Abdominal: Soft, Non-Tender


 (Male) Exam: Deferred


Rectal (Males) Exam: Deferred


Back Exam: Normal Inspection


Extremities: Normal Inspection


Neurological: Alert, Oriented, Normal Cognition


Psychiatric: Anxious





Course





- Vital Signs


Last Recorded V/S: 


 Last Vital Signs











Temp  35.6 C   01/21/19 05:40


 


Pulse  82   01/21/19 05:40


 


Resp  16   01/21/19 05:40


 


BP  153/65 H  01/21/19 05:40


 


Pulse Ox  95   01/21/19 05:40














- Orders/Labs/Meds


Orders: 


 Active Orders 24 hr











 Category Date Time Status


 


 Head wo Cont [CT] Stat Exams  01/21/19 06:15 Ordered


 


 UA W/MICROSCOPIC [URIN] Urgent Lab  01/21/19 05:38 Ordered











Labs: 


 Laboratory Tests











  01/21/19 01/21/19 Range/Units





  05:48 05:48 


 


WBC  7.5   (4.5-11.0)  K/uL


 


RBC  4.58   (4.30-5.90)  M/uL


 


Hgb  14.1   (12.0-15.0)  g/dL


 


Hct  42.1   (40.0-54.0)  %


 


MCV  92   (80-98)  fL


 


MCH  31   (27-31)  pg


 


MCHC  34   (32-36)  %


 


Plt Count  209   (150-400)  K/uL


 


Neut % (Auto)  71 H   (36-66)  %


 


Lymph % (Auto)  20 L   (24-44)  %


 


Mono % (Auto)  8 H   (2-6)  %


 


Eos % (Auto)  1 L   (2-4)  %


 


Baso % (Auto)  0   (0-1)  %


 


Sodium   139 L  (140-148)  mmol/L


 


Potassium   3.3 L  (3.6-5.2)  mmol/L


 


Chloride   99 L  (100-108)  mmol/L


 


Carbon Dioxide   30  (21-32)  mmol/L


 


Anion Gap   13.3  (5.0-14.0)  mmol/L


 


BUN   8  (7-18)  mg/dL


 


Creatinine   1.2  (0.8-1.3)  mg/dL


 


Est Cr Clr Drug Dosing   62.03  mL/min


 


Estimated GFR (MDRD)   > 60  (>60)  


 


Glucose   112 H  ()  mg/dL


 


Calcium   9.3  (8.5-10.1)  mg/dL


 


Total Bilirubin   1.1 H D  (0.2-1.0)  mg/dL


 


AST   36  (15-37)  U/L


 


ALT   53  D  (12-78)  U/L


 


Alkaline Phosphatase   149 H  ()  U/L


 


Total Protein   8.2  (6.4-8.2)  g/dL


 


Albumin   3.9  (3.4-5.0)  g/dL


 


Globulin   4.3 H  (2.3-3.5)  g/dL


 


Albumin/Globulin Ratio   0.9 L  (1.2-2.2)  











Meds: 


Medications














Discontinued Medications














Generic Name Dose Route Start Last Admin





  Trade Name Freq  PRN Reason Stop Dose Admin


 


Potassium Chloride  40 meq  01/21/19 06:13  01/21/19 06:26





  Klor-Con M20  PO  01/21/19 06:14  40 meq





  ONETIME ONE   Administration





     





     





     





     














- Re-Assessments/Exams


Free Text/Narrative Re-Assessment/Exam: 





01/21/19 06:28


pt refused to have blood gases, He refused to give a urine. His k was found to 

be low and he was given 40 meq po. 


01/21/19 06:34


pt refused to hve a cat scan of the head. 


01/21/19 06:36


Pt is going to talk to Dr Piña regarding the cat scan. 





Departure





- Departure


Time of Disposition: 06:37


Disposition: Home, Self-Care 01


Condition: Fair


Clinical Impression: 


 Hypokalemia, Muscle spasm








- Discharge Information


Referrals: 


PCP,None [Primary Care Provider] - 


Forms:  ED Department Discharge


Care Plan Goals: 


pt wants to talk to Dr Piña regarding the cat scan. kcl 10 meq 1 tab dialy 

for 1 week. 





- My Orders


Last 24 Hours: 


My Active Orders





01/21/19 05:38


UA W/MICROSCOPIC [URIN] Urgent 





01/21/19 06:15


Head wo Cont [CT] Stat 














- Assessment/Plan


Last 24 Hours: 


My Active Orders





01/21/19 05:38


UA W/MICROSCOPIC [URIN] Urgent 





01/21/19 06:15


Head wo Cont [CT] Stat

## 2019-02-14 ENCOUNTER — HOSPITAL ENCOUNTER (OUTPATIENT)
Dept: HOSPITAL 11 - JP.ED | Age: 59
Setting detail: OBSERVATION
LOS: 1 days | Discharge: HOME | End: 2019-02-15
Attending: INTERNAL MEDICINE | Admitting: INTERNAL MEDICINE
Payer: MEDICAID

## 2019-02-14 DIAGNOSIS — E78.00: ICD-10-CM

## 2019-02-14 DIAGNOSIS — Z79.82: ICD-10-CM

## 2019-02-14 DIAGNOSIS — A08.4: ICD-10-CM

## 2019-02-14 DIAGNOSIS — G20: ICD-10-CM

## 2019-02-14 DIAGNOSIS — F15.10: ICD-10-CM

## 2019-02-14 DIAGNOSIS — I10: ICD-10-CM

## 2019-02-14 DIAGNOSIS — R11.2: ICD-10-CM

## 2019-02-14 DIAGNOSIS — Z86.73: ICD-10-CM

## 2019-02-14 DIAGNOSIS — E86.0: Primary | ICD-10-CM

## 2019-02-14 DIAGNOSIS — Z79.899: ICD-10-CM

## 2019-02-14 PROCEDURE — 94640 AIRWAY INHALATION TREATMENT: CPT

## 2019-02-14 PROCEDURE — G0378 HOSPITAL OBSERVATION PER HR: HCPCS

## 2019-02-14 PROCEDURE — 99285 EMERGENCY DEPT VISIT HI MDM: CPT

## 2019-02-14 PROCEDURE — 80305 DRUG TEST PRSMV DIR OPT OBS: CPT

## 2019-02-14 PROCEDURE — 80053 COMPREHEN METABOLIC PANEL: CPT

## 2019-02-14 PROCEDURE — 76705 ECHO EXAM OF ABDOMEN: CPT

## 2019-02-14 PROCEDURE — 71045 X-RAY EXAM CHEST 1 VIEW: CPT

## 2019-02-14 PROCEDURE — 85025 COMPLETE CBC W/AUTO DIFF WBC: CPT

## 2019-02-14 PROCEDURE — 96375 TX/PRO/DX INJ NEW DRUG ADDON: CPT

## 2019-02-14 PROCEDURE — 83690 ASSAY OF LIPASE: CPT

## 2019-02-14 PROCEDURE — 83735 ASSAY OF MAGNESIUM: CPT

## 2019-02-14 PROCEDURE — 36415 COLL VENOUS BLD VENIPUNCTURE: CPT

## 2019-02-14 PROCEDURE — 81001 URINALYSIS AUTO W/SCOPE: CPT

## 2019-02-14 PROCEDURE — 96361 HYDRATE IV INFUSION ADD-ON: CPT

## 2019-02-14 PROCEDURE — 85027 COMPLETE CBC AUTOMATED: CPT

## 2019-02-14 PROCEDURE — 96374 THER/PROPH/DIAG INJ IV PUSH: CPT

## 2019-02-14 PROCEDURE — 96376 TX/PRO/DX INJ SAME DRUG ADON: CPT

## 2019-02-14 PROCEDURE — C9113 INJ PANTOPRAZOLE SODIUM, VIA: HCPCS

## 2019-02-14 NOTE — EDM.PDOC
ED HPI GENERAL MEDICAL PROBLEM





- General


Chief Complaint: Gastrointestinal Problem


Stated Complaint: VOMITING


Time Seen by Provider: 02/14/19 08:59


Source of Information: Reports: Patient


History Limitations: Reports: No Limitations





- History of Present Illness


INITIAL COMMENTS - FREE TEXT/NARRATIVE: 





pt arrived with pain in his upper abdoman and he has not held anything down for 

the past 5 days. He was hydrated with 1 liter  yesterday. He had a urine 

specfic gravity of 1.o30. He has not held fluids down for 5 days. 


Onset: Gradual, Other ( Started 5 days ago. )


Duration: Hour(s):


Location: Reports: Abdomen


Associated Symptoms: Reports: Nausea/Vomiting, Other (upper abdomanal pain. )


  ** Abdomen


Pain Score (Numeric/FACES): 4





- Related Data


 Allergies











Allergy/AdvReac Type Severity Reaction Status Date / Time


 


No Known Allergies Allergy   Verified 02/14/19 08:31











Home Meds: 


 Home Meds





Mirtazapine [Remeron] 15 mg PO BEDTIME 09/17/14 [History]


Isosorbide Mononitrate [Isosorbide Mononitrate ER] 120 mg PO DAILY 02/28/15 [

History]


Prazosin [Minpress] 2 mg PO BID 06/20/18 [History]


QUEtiapine [SEROquel XR] 300 mg PO BEDTIME 06/20/18 [History]


lamoTRIgine 1 tab PO DAILY 08/14/18 [History]


QUEtiapine [SEROquel] 25 mg PO DAILY 08/19/18 [History]


Albuterol Sulfate 2 puff IH QID 11/09/18 [History]


Albuterol Sulfate 3 ml IH Q4HR PRN 11/09/18 [History]


Aspirin [Halfprin] 81 mg PO DAILY 11/09/18 [History]


Furosemide 40 mg PO DAILY 11/09/18 [History]


Lisinopril 2.5 mg PO DAILY 11/09/18 [History]


Metoprolol Succinate 100 mg PO BID 11/09/18 [History]


Nitroglycerin 0.4 mg PO ASDIRECTED PRN 11/09/18 [History]


Vilazodone [Viibryd] 40 mg PO DAILY 11/09/18 [History]


atorvaSTATin Calcium [Atorvastatin Calcium] 40 mg PO DAILY 11/09/18 [History]


Carbidopa/Levodopa [Carbidopa-Levodopa  Tab] 1 tab PO QID 12/22/18 [

History]


Gabapentin [Neurontin] 2 tab PO BEDTIME 12/22/18 [History]


Meclizine HCl 25 mg PO DAILY 01/03/19 [History]


clonazePAM [Klonopin] 1 mg PO BEDTIME 01/03/19 [History]











Past Medical History


HEENT History: Reports: Impaired Vision


Other HEENT History: prosthetic eye right


Cardiovascular History: Reports: CAD, High Cholesterol, Hypertension, MI, 

Pacemaker, SOB on Exertion, Stents


Gastrointestinal History: Reports: GERD


Neurological History: Reports: CVA, Parkinson's


Psychiatric History: Reports: Anxiety, Bipolar, Depression, Mood Swings, PTSD, 

Suicide Attempt





- Infectious Disease History


Infectious Disease History: Reports: Chicken Pox


Other Infectious Disease History: unable to obtain





- Past Surgical History


HEENT Surgical History: Reports: Eye Surgery


Cardiovascular Surgical History: Reports: AICD, Coronary Artery Stent, Other (

See Below)


Other Cardiovascular Surgeries/Procedures: pacer defibrillator


GI Surgical History: Reports: EGD, Hernia, Abdominal





Social & Family History





- Tobacco Use


Smoking Status *Q: Current Status Unknown





- Caffeine Use


Caffeine Use: Reports: Other


Other Caffeine Use: "seldom"





- Recreational Drug Use


Recreational Drug Use: No





ED ROS GENERAL





- Review of Systems


Review Of Systems: See Below


Constitutional: Reports: Weakness, Fatigue, Decreased Appetite


HEENT: Reports: No Symptoms


Respiratory: Reports: Shortness of Breath


Cardiovascular: Reports: No Symptoms


Endocrine: Reports: No Symptoms


GI/Abdominal: Reports: Abdominal Pain, Decreased Appetite, Nausea, Vomiting, 

Other (pt has  upper abdomanal pain and persistent vomiting for 5 days. )


: Reports: Other (pt is voiding very little because of the poor intake. )


Musculoskeletal: Reports: No Symptoms


Skin: Reports: No Symptoms





ED EXAM, GI/ABD





- Physical Exam


Exam: See Below


Text/Narrative:: 





Pt is very anxious and he is fustrated because he continues to vomit. He has 

upper abdomanal pain. He is not having diarrhea. 


Exam Limited By: No Limitations


General Appearance: Alert, Anxious, Mild Distress, Other (Pt does get fustrated 

very easily. Pupils are equal and reactive. )


Ears: Normal TMs


Nose: Normal Inspection


Throat/Mouth: Normal Inspection


Head: Atraumatic


Neck: Normal Inspection


Respiratory/Chest: No Respiratory Distress


Cardiovascular: Regular Rate, Rhythm, Tachycardia


GI/Abdominal Exam: Tender, Other (Pt has tenderness in the upper abdoman 

without true guarding. )


 (Male) Exam: Deferred


Rectal (Males) Exam: Deferred


Back Exam: Normal Inspection


Extremities: Normal Inspection


Neurological: Alert, Oriented, Normal Cognition, Other (Pt is quite agitated)


Psychiatric: Anxious





Course





- Vital Signs


Last Recorded V/S: 


 Last Vital Signs











Temp  35.0 C L  02/14/19 08:28


 


Pulse  100   02/14/19 09:18


 


Resp  16   02/14/19 09:18


 


BP  145/92 H  02/14/19 09:18


 


Pulse Ox  100   02/14/19 09:18














- Orders/Labs/Meds


Orders: 


 Active Orders 24 hr











 Category Date Time Status


 


 Sodium Chloride 0.9% [Normal Saline] 1,000 ml Med  02/14/19 09:00 Active





 IV ASDIRECTED   


 


 Sodium Chloride 0.9% [Normal Saline] 1,000 ml Med  02/14/19 10:00 Active





 IV ASDIRECTED   


 


 Sodium Chloride 0.9% [Normal Saline] 1,000 ml Med  02/14/19 11:30 Active





 IV ASDIRECTED   








 Medication Orders





Sodium Chloride (Normal Saline)  1,000 mls @ 999 mls/hr IV ASDIRECTED ELLIE


   Last Admin: 02/14/19 09:17  Dose: 999 mls/hr


Sodium Chloride (Normal Saline)  1,000 mls @ 999 mls/hr IV ASDIRECTED ELLIE


   Last Admin: 02/14/19 10:14  Dose: 999 mls/hr


Sodium Chloride (Normal Saline)  1,000 mls @ 250 mls/hr IV ASDIRECTED ELLIE








Labs: 


 Laboratory Tests











  02/14/19 02/14/19 02/14/19 Range/Units





  08:54 08:56 09:02 


 


WBC    8.8  (4.5-11.0)  K/uL


 


RBC    5.30  (4.30-5.90)  M/uL


 


Hgb    16.1 H D  (12.0-15.0)  g/dL


 


Hct    49.2  (40.0-54.0)  %


 


MCV    93  (80-98)  fL


 


MCH    30  (27-31)  pg


 


MCHC    33  (32-36)  %


 


Plt Count    230  (150-400)  K/uL


 


Neut % (Auto)    72 H  (36-66)  %


 


Lymph % (Auto)    17 L  (24-44)  %


 


Mono % (Auto)    9 H  (2-6)  %


 


Eos % (Auto)    1 L  (2-4)  %


 


Baso % (Auto)    0  (0-1)  %


 


Sodium     (140-148)  mmol/L


 


Potassium     (3.6-5.2)  mmol/L


 


Chloride     (100-108)  mmol/L


 


Carbon Dioxide     (21-32)  mmol/L


 


Anion Gap     (5.0-14.0)  mmol/L


 


BUN     (7-18)  mg/dL


 


Creatinine     (0.8-1.3)  mg/dL


 


Est Cr Clr Drug Dosing     mL/min


 


Estimated GFR (MDRD)     (>60)  


 


Glucose     ()  mg/dL


 


Calcium     (8.5-10.1)  mg/dL


 


Magnesium     (1.8-2.4)  mg/dL


 


Total Bilirubin     (0.2-1.0)  mg/dL


 


AST     (15-37)  U/L


 


ALT     (12-78)  U/L


 


Alkaline Phosphatase     ()  U/L


 


Total Protein     (6.4-8.2)  g/dL


 


Albumin     (3.4-5.0)  g/dL


 


Globulin     (2.3-3.5)  g/dL


 


Albumin/Globulin Ratio     (1.2-2.2)  


 


Lipase     ()  U/L


 


Urine Color  Yellow    


 


Urine Appearance  Slightly cloudy    


 


Urine pH  5.0    (4.5-8.0)  


 


Ur Specific Gravity  1.030    (1.008-1.030)  


 


Urine Protein  30 H    (NEGATIVE)  mg/dL


 


Urine Glucose (UA)  Normal    (NEGATIVE)  mg/dL


 


Urine Ketones  150 H    (NEGATIVE)  mg/dL


 


Urine Occult Blood  Trace    (NEGATIVE)  


 


Urine Nitrite  Negative    (NEGAITVE)  


 


Urine Bilirubin  Moderate    (NEGATIVE)  


 


Urine Urobilinogen  Normal    (NORMAL)  mg/dL


 


Ur Leukocyte Esterase  Negative    (NEGATIVE)  


 


Urine RBC  0-5    (0-5)  


 


Urine WBC  0-5    (0-5)  


 


Ur Epithelial Cells  Many    


 


Amorphous Sediment  Few    


 


Urine Bacteria  Few    


 


Urine Mucus  Many    


 


Urine Other      


 


Urine Opiates Screen   Negative   (NEGATIVE)  


 


Ur Oxycodone Screen   Negative   (NEGATIVE)  


 


Urine Methadone Screen   Negative   (NEGATIVE)  


 


Ur Propoxyphene Screen   Negative   (NEGATIVE)  


 


Ur Barbiturates Screen   Negative   (NEGATIVE)  


 


Ur Tricyclics Screen   Negative   (NEGATIVE)  


 


Ur Phencyclidine Scrn   Negative   (NEGATIVE)  


 


Ur Amphetamine Screen   Presumptive positive H   (NEGATIVE)  


 


U Methamphetamines Scrn   Presumptive positive H   (NEGATIVE)  


 


Urine MDMA Screen   Negative   (NEGATIVE)  


 


U Benzodiazepines Scrn   Negative   (NEGATIVE)  


 


U Cocaine Metab Screen   Negative   (NEGATIVE)  


 


U Marijuana (THC) Screen   Negative   (NEGATIVE)  














  02/14/19 02/14/19 Range/Units





  09:02 09:02 


 


WBC    (4.5-11.0)  K/uL


 


RBC    (4.30-5.90)  M/uL


 


Hgb    (12.0-15.0)  g/dL


 


Hct    (40.0-54.0)  %


 


MCV    (80-98)  fL


 


MCH    (27-31)  pg


 


MCHC    (32-36)  %


 


Plt Count    (150-400)  K/uL


 


Neut % (Auto)    (36-66)  %


 


Lymph % (Auto)    (24-44)  %


 


Mono % (Auto)    (2-6)  %


 


Eos % (Auto)    (2-4)  %


 


Baso % (Auto)    (0-1)  %


 


Sodium  144   (140-148)  mmol/L


 


Potassium  4.0   (3.6-5.2)  mmol/L


 


Chloride  103   (100-108)  mmol/L


 


Carbon Dioxide  19 L   (21-32)  mmol/L


 


Anion Gap  26.0 H   (5.0-14.0)  mmol/L


 


BUN  17  D   (7-18)  mg/dL


 


Creatinine  1.1   (0.8-1.3)  mg/dL


 


Est Cr Clr Drug Dosing  67.67   mL/min


 


Estimated GFR (MDRD)  > 60   (>60)  


 


Glucose  73 L   ()  mg/dL


 


Calcium  10.1   (8.5-10.1)  mg/dL


 


Magnesium  2.0   (1.8-2.4)  mg/dL


 


Total Bilirubin  1.5 H   (0.2-1.0)  mg/dL


 


AST  30   (15-37)  U/L


 


ALT  31   (12-78)  U/L


 


Alkaline Phosphatase  144 H   ()  U/L


 


Total Protein  8.8 H   (6.4-8.2)  g/dL


 


Albumin  4.2   (3.4-5.0)  g/dL


 


Globulin  4.6 H   (2.3-3.5)  g/dL


 


Albumin/Globulin Ratio  0.9 L   (1.2-2.2)  


 


Lipase   89  ()  U/L


 


Urine Color    


 


Urine Appearance    


 


Urine pH    (4.5-8.0)  


 


Ur Specific Gravity    (1.008-1.030)  


 


Urine Protein    (NEGATIVE)  mg/dL


 


Urine Glucose (UA)    (NEGATIVE)  mg/dL


 


Urine Ketones    (NEGATIVE)  mg/dL


 


Urine Occult Blood    (NEGATIVE)  


 


Urine Nitrite    (NEGAITVE)  


 


Urine Bilirubin    (NEGATIVE)  


 


Urine Urobilinogen    (NORMAL)  mg/dL


 


Ur Leukocyte Esterase    (NEGATIVE)  


 


Urine RBC    (0-5)  


 


Urine WBC    (0-5)  


 


Ur Epithelial Cells    


 


Amorphous Sediment    


 


Urine Bacteria    


 


Urine Mucus    


 


Urine Other    


 


Urine Opiates Screen    (NEGATIVE)  


 


Ur Oxycodone Screen    (NEGATIVE)  


 


Urine Methadone Screen    (NEGATIVE)  


 


Ur Propoxyphene Screen    (NEGATIVE)  


 


Ur Barbiturates Screen    (NEGATIVE)  


 


Ur Tricyclics Screen    (NEGATIVE)  


 


Ur Phencyclidine Scrn    (NEGATIVE)  


 


Ur Amphetamine Screen    (NEGATIVE)  


 


U Methamphetamines Scrn    (NEGATIVE)  


 


Urine MDMA Screen    (NEGATIVE)  


 


U Benzodiazepines Scrn    (NEGATIVE)  


 


U Cocaine Metab Screen    (NEGATIVE)  


 


U Marijuana (THC) Screen    (NEGATIVE)  











Meds: 


Medications











Generic Name Dose Route Start Last Admin





  Trade Name Freq  PRN Reason Stop Dose Admin


 


Sodium Chloride  1,000 mls @ 999 mls/hr  02/14/19 09:00  02/14/19 09:17





  Normal Saline  IV   999 mls/hr





  ASDIRECTED ELLIE   Administration





     





     





     





     


 


Sodium Chloride  1,000 mls @ 999 mls/hr  02/14/19 10:00  02/14/19 10:14





  Normal Saline  IV   999 mls/hr





  ASDIRECTED ELLIE   Administration





     





     





     





     


 


Sodium Chloride  1,000 mls @ 250 mls/hr  02/14/19 11:30  





  Normal Saline  IV   





  ASDIRECTED ELLIE   





     





     





     





     














Discontinued Medications














Generic Name Dose Route Start Last Admin





  Trade Name Freq  PRN Reason Stop Dose Admin


 


Carbidopa/Levodopa  1 tab  02/14/19 11:55  





  Sinemet  Mg  PO  02/14/19 11:56  





  ONETIME ONE   





     





     





     





     


 


Lorazepam  1 mg  02/14/19 08:56  02/14/19 09:12





  Ativan  IVPUSH  02/14/19 08:57  1 mg





  ONETIME ONE   Administration





     





     





     





     


 


Lorazepam  0.5 mg  02/14/19 11:55  





  Ativan  IVPUSH  02/14/19 11:56  





  ONETIME ONE   





     





     





     





     


 


Ondansetron HCl  4 mg  02/14/19 08:55  02/14/19 09:13





  Zofran  IVPUSH  02/14/19 08:56  4 mg





  ONETIME ONE   Administration





     





     





     





     














- Re-Assessments/Exams


Free Text/Narrative Re-Assessment/Exam: 





02/14/19 09:34


pt continues to be very dehydrated. He states he is not holding anything down. 

He has a specfic gravity  of 1030. His wbc is normal.  He was given ativan 1 mg 

and zoforan 4mg iv. He has a liter of fluid going at 999. 





Departure





- Departure


Time of Disposition: 11:59


Disposition: Admitted As Inpatient 68


Condition: Fair


Clinical Impression: 


 Dehydration, Persistent vomiting, Methamphetamine abuse








- Discharge Information


Referrals: 


Ezequiel Piña MD [Primary Care Provider] - 


Forms:  ED Department Discharge


Care Plan Goals: 


admit to Dr hook. 





- My Orders


Last 24 Hours: 


My Active Orders





02/14/19 09:00


Sodium Chloride 0.9% [Normal Saline] 1,000 ml IV ASDIRECTED 





02/14/19 10:00


Sodium Chloride 0.9% [Normal Saline] 1,000 ml IV ASDIRECTED 





02/14/19 11:30


Sodium Chloride 0.9% [Normal Saline] 1,000 ml IV ASDIRECTED 














- Assessment/Plan


Last 24 Hours: 


My Active Orders





02/14/19 09:00


Sodium Chloride 0.9% [Normal Saline] 1,000 ml IV ASDIRECTED 





02/14/19 10:00


Sodium Chloride 0.9% [Normal Saline] 1,000 ml IV ASDIRECTED 





02/14/19 11:30


Sodium Chloride 0.9% [Normal Saline] 1,000 ml IV ASDIRECTED

## 2019-02-14 NOTE — CRLCR
INDICATION:



History of CHF.



COMPARISON:



Chest radiograph December 25, 2018.



TECHNIQUE:



Portable AP chest.



FINDINGS:



Cardiac pacer in place. Coronary stents. Normal size cardiac silhouette. No 

acute pneumonic infiltrates or CHF. No pneumothorax or pleural effusion.



IMPRESSION:



No acute pathology.



Dictated by Geremias Stone MD @ Feb 14 2019 10:10AM



Signed by Dr. Geremias Stone @ Feb 14 2019 10:11AM

## 2019-02-14 NOTE — PCM.HP
H&P History of Present Illness





- General


Date of Service: 02/14/19


Admit Problem/Dx: 


 Admission Diagnosis/Problem





Admission Diagnosis/Problem      Dehydration








Source of Information: Patient, Provider


History Limitations: Reports: No Limitations





- History of Present Illness


Initial Comments - Free Text/Narative: 





Ousmane presents to the emergency room today with a chief complaint of nausea and 

vomiting. He reports 5 days of having difficulty keeping anything down. He 

reports that he has vomited up pills when he tries to take them, liquids, 

crackers and pretty much anything he tries to take in by mouth. He does report 

some epigastric abdominal pain which he describes as sharp and moderate in 

nature. The pain does not radiate. He has not taken anything to make it feel 

better. It does seem to get a little bit worse when he vomits. He reports 

subjective fevers but has not measured any temperatures. No complaints of 

diarrhea. He does not feel short of breath and has not been coughing. No sick 

contacts or travel. No new medications. When asked about the methamphetamines 

in his drug screen he adamantly denies using any illicit drugs.





Workup in the emergency room was suggestive of dehydration. He did receive 3 L 

of fluid in the emergency room. He has not had any vomiting in the emergency 

room. He will be admitted for additional hydration and symptom management.


  ** Abdomen


Pain Score (Numeric/FACES): 4





- Related Data


Allergies/Adverse Reactions: 


 Allergies











Allergy/AdvReac Type Severity Reaction Status Date / Time


 


No Known Allergies Allergy   Verified 02/14/19 08:31











Home Medications: 


 Home Meds





Mirtazapine [Remeron] 15 mg PO BEDTIME 09/17/14 [History]


Isosorbide Mononitrate [Isosorbide Mononitrate ER] 120 mg PO DAILY 02/28/15 [

History]


Prazosin [Minpress] 2 mg PO BID 06/20/18 [History]


lamoTRIgine 1 tab PO DAILY 08/14/18 [History]


QUEtiapine [SEROquel] 25 mg PO DAILY 08/19/18 [History]


Albuterol Sulfate 2 puff IH QID 11/09/18 [History]


Albuterol Sulfate 3 ml IH Q4HR PRN 11/09/18 [History]


Aspirin [Halfprin] 81 mg PO DAILY 11/09/18 [History]


Furosemide 40 mg PO DAILY 11/09/18 [History]


Lisinopril 2.5 mg PO DAILY 11/09/18 [History]


Metoprolol Succinate 100 mg PO BID 11/09/18 [History]


Nitroglycerin 0.4 mg PO ASDIRECTED PRN 11/09/18 [History]


Vilazodone [Viibryd] 40 mg PO DAILY 11/09/18 [History]


atorvaSTATin Calcium [Atorvastatin Calcium] 40 mg PO DAILY 11/09/18 [History]


Carbidopa/Levodopa [Carbidopa-Levodopa  Tab] 1 tab PO QID 12/22/18 [

History]


Gabapentin [Neurontin] 2 tab PO BEDTIME 12/22/18 [History]


Meclizine HCl 25 mg PO DAILY 01/03/19 [History]


clonazePAM [Klonopin] 1 mg PO BEDTIME 01/03/19 [History]


QUEtiapine Fumarate [Quetiapine Fumarate] 300 mg PO BEDTIME 02/14/19 [History]











Past Medical History


HEENT History: Reports: Impaired Vision


Other HEENT History: prosthetic eye right


Cardiovascular History: Reports: CAD, High Cholesterol, Hypertension, MI, 

Pacemaker, SOB on Exertion, Stents


Gastrointestinal History: Reports: GERD


Neurological History: Reports: CVA, Parkinson's


Psychiatric History: Reports: Anxiety, Bipolar, Depression, Mood Swings, PTSD, 

Suicide Attempt





- Infectious Disease History


Infectious Disease History: Reports: Chicken Pox


Other Infectious Disease History: unable to obtain





- Past Surgical History


HEENT Surgical History: Reports: Eye Surgery


Cardiovascular Surgical History: Reports: AICD, Coronary Artery Stent, Other (

See Below)


Other Cardiovascular Surgeries/Procedures: pacer defibrillator


GI Surgical History: Reports: EGD, Hernia, Abdominal





Social & Family History





- Family History


Cardiac: Reports: CAD





- Tobacco Use


Smoking Status *Q: Current Status Unknown





- Caffeine Use


Caffeine Use: Reports: Other


Other Caffeine Use: "seldom"





- Alcohol Use


Alcohol Use History: No





- Recreational Drug Use


Recreational Drug Use: No





H&P Review of Systems





- Review of Systems:


Review Of Systems: See Below


Free Text/Narrative: 





A complete 12 point review of systems was obtained.  Pertinent positives and 

negatives are noted in the history of present illness.  All other systems were 

reviewed and were negative except as noted.





Exam





- Exam


Exam: See Below





- Vital Signs


Vital Signs: 


 Last Vital Signs











Temp  35.0 C L  02/14/19 08:28


 


Pulse  87   02/14/19 12:13


 


Resp  14   02/14/19 12:13


 


BP  156/99 H  02/14/19 12:13


 


Pulse Ox  94 L  02/14/19 12:13











Weight: 79.379 kg





- Exam


Quality Assessment: No: Supplemental Oxygen


General: Alert, Oriented, Cooperative.  No: Mild Distress


HEENT: Conjunctiva Clear, Mucosa Moist & Pink.  No: Scleral Icterus


Neck: Supple, Trachea Midline.  No: Lymphadenopathy


Lungs: Clear to Auscultation, Normal Respiratory Effort


Cardiovascular: Regular Rate, Regular Rhythm


GI/Abdominal Exam: Normal Bowel Sounds, Soft, No Distention, Tender (mild 

epigastric )


Back Exam: Normal Inspection, Full Range of Motion


Extremities: No Pedal Edema.  No: Increased Warmth


Skin: Warm, Dry


Neuro Extensive - Mental Status: Alert, Oriented x3, Nl Response to Commands


Neuro Extensive - Motor, Sensory, Reflexes: CN II-XII Intact.  No: Dysarthria, 

Abnormal Motor, Tremor


Psychiatric: Alert, Normal Affect





- Patient Data


Lab Results Last 24 hrs: 


 Laboratory Results - last 24 hr











  02/14/19 02/14/19 02/14/19 Range/Units





  08:54 08:56 09:02 


 


WBC    8.8  (4.5-11.0)  K/uL


 


RBC    5.30  (4.30-5.90)  M/uL


 


Hgb    16.1 H D  (12.0-15.0)  g/dL


 


Hct    49.2  (40.0-54.0)  %


 


MCV    93  (80-98)  fL


 


MCH    30  (27-31)  pg


 


MCHC    33  (32-36)  %


 


Plt Count    230  (150-400)  K/uL


 


Neut % (Auto)    72 H  (36-66)  %


 


Lymph % (Auto)    17 L  (24-44)  %


 


Mono % (Auto)    9 H  (2-6)  %


 


Eos % (Auto)    1 L  (2-4)  %


 


Baso % (Auto)    0  (0-1)  %


 


Sodium     (140-148)  mmol/L


 


Potassium     (3.6-5.2)  mmol/L


 


Chloride     (100-108)  mmol/L


 


Carbon Dioxide     (21-32)  mmol/L


 


Anion Gap     (5.0-14.0)  mmol/L


 


BUN     (7-18)  mg/dL


 


Creatinine     (0.8-1.3)  mg/dL


 


Est Cr Clr Drug Dosing     mL/min


 


Estimated GFR (MDRD)     (>60)  


 


Glucose     ()  mg/dL


 


Calcium     (8.5-10.1)  mg/dL


 


Magnesium     (1.8-2.4)  mg/dL


 


Total Bilirubin     (0.2-1.0)  mg/dL


 


AST     (15-37)  U/L


 


ALT     (12-78)  U/L


 


Alkaline Phosphatase     ()  U/L


 


Total Protein     (6.4-8.2)  g/dL


 


Albumin     (3.4-5.0)  g/dL


 


Globulin     (2.3-3.5)  g/dL


 


Albumin/Globulin Ratio     (1.2-2.2)  


 


Lipase     ()  U/L


 


Urine Color  Yellow    


 


Urine Appearance  Slightly cloudy    


 


Urine pH  5.0    (4.5-8.0)  


 


Ur Specific Gravity  1.030    (1.008-1.030)  


 


Urine Protein  30 H    (NEGATIVE)  mg/dL


 


Urine Glucose (UA)  Normal    (NEGATIVE)  mg/dL


 


Urine Ketones  150 H    (NEGATIVE)  mg/dL


 


Urine Occult Blood  Trace    (NEGATIVE)  


 


Urine Nitrite  Negative    (NEGAITVE)  


 


Urine Bilirubin  Moderate    (NEGATIVE)  


 


Urine Urobilinogen  Normal    (NORMAL)  mg/dL


 


Ur Leukocyte Esterase  Negative    (NEGATIVE)  


 


Urine RBC  0-5    (0-5)  


 


Urine WBC  0-5    (0-5)  


 


Ur Epithelial Cells  Many    


 


Amorphous Sediment  Few    


 


Urine Bacteria  Few    


 


Urine Mucus  Many    


 


Urine Other      


 


Urine Opiates Screen   Negative   (NEGATIVE)  


 


Ur Oxycodone Screen   Negative   (NEGATIVE)  


 


Urine Methadone Screen   Negative   (NEGATIVE)  


 


Ur Propoxyphene Screen   Negative   (NEGATIVE)  


 


Ur Barbiturates Screen   Negative   (NEGATIVE)  


 


Ur Tricyclics Screen   Negative   (NEGATIVE)  


 


Ur Phencyclidine Scrn   Negative   (NEGATIVE)  


 


Ur Amphetamine Screen   Presumptive positive H   (NEGATIVE)  


 


U Methamphetamines Scrn   Presumptive positive H   (NEGATIVE)  


 


Urine MDMA Screen   Negative   (NEGATIVE)  


 


U Benzodiazepines Scrn   Negative   (NEGATIVE)  


 


U Cocaine Metab Screen   Negative   (NEGATIVE)  


 


U Marijuana (THC) Screen   Negative   (NEGATIVE)  














  02/14/19 02/14/19 Range/Units





  09:02 09:02 


 


WBC    (4.5-11.0)  K/uL


 


RBC    (4.30-5.90)  M/uL


 


Hgb    (12.0-15.0)  g/dL


 


Hct    (40.0-54.0)  %


 


MCV    (80-98)  fL


 


MCH    (27-31)  pg


 


MCHC    (32-36)  %


 


Plt Count    (150-400)  K/uL


 


Neut % (Auto)    (36-66)  %


 


Lymph % (Auto)    (24-44)  %


 


Mono % (Auto)    (2-6)  %


 


Eos % (Auto)    (2-4)  %


 


Baso % (Auto)    (0-1)  %


 


Sodium  144   (140-148)  mmol/L


 


Potassium  4.0   (3.6-5.2)  mmol/L


 


Chloride  103   (100-108)  mmol/L


 


Carbon Dioxide  19 L   (21-32)  mmol/L


 


Anion Gap  26.0 H   (5.0-14.0)  mmol/L


 


BUN  17  D   (7-18)  mg/dL


 


Creatinine  1.1   (0.8-1.3)  mg/dL


 


Est Cr Clr Drug Dosing  67.67   mL/min


 


Estimated GFR (MDRD)  > 60   (>60)  


 


Glucose  73 L   ()  mg/dL


 


Calcium  10.1   (8.5-10.1)  mg/dL


 


Magnesium  2.0   (1.8-2.4)  mg/dL


 


Total Bilirubin  1.5 H   (0.2-1.0)  mg/dL


 


AST  30   (15-37)  U/L


 


ALT  31   (12-78)  U/L


 


Alkaline Phosphatase  144 H   ()  U/L


 


Total Protein  8.8 H   (6.4-8.2)  g/dL


 


Albumin  4.2   (3.4-5.0)  g/dL


 


Globulin  4.6 H   (2.3-3.5)  g/dL


 


Albumin/Globulin Ratio  0.9 L   (1.2-2.2)  


 


Lipase   89  ()  U/L


 


Urine Color    


 


Urine Appearance    


 


Urine pH    (4.5-8.0)  


 


Ur Specific Gravity    (1.008-1.030)  


 


Urine Protein    (NEGATIVE)  mg/dL


 


Urine Glucose (UA)    (NEGATIVE)  mg/dL


 


Urine Ketones    (NEGATIVE)  mg/dL


 


Urine Occult Blood    (NEGATIVE)  


 


Urine Nitrite    (NEGAITVE)  


 


Urine Bilirubin    (NEGATIVE)  


 


Urine Urobilinogen    (NORMAL)  mg/dL


 


Ur Leukocyte Esterase    (NEGATIVE)  


 


Urine RBC    (0-5)  


 


Urine WBC    (0-5)  


 


Ur Epithelial Cells    


 


Amorphous Sediment    


 


Urine Bacteria    


 


Urine Mucus    


 


Urine Other    


 


Urine Opiates Screen    (NEGATIVE)  


 


Ur Oxycodone Screen    (NEGATIVE)  


 


Urine Methadone Screen    (NEGATIVE)  


 


Ur Propoxyphene Screen    (NEGATIVE)  


 


Ur Barbiturates Screen    (NEGATIVE)  


 


Ur Tricyclics Screen    (NEGATIVE)  


 


Ur Phencyclidine Scrn    (NEGATIVE)  


 


Ur Amphetamine Screen    (NEGATIVE)  


 


U Methamphetamines Scrn    (NEGATIVE)  


 


Urine MDMA Screen    (NEGATIVE)  


 


U Benzodiazepines Scrn    (NEGATIVE)  


 


U Cocaine Metab Screen    (NEGATIVE)  


 


U Marijuana (THC) Screen    (NEGATIVE)  











Result Diagrams: 


 02/14/19 09:02





 02/14/19 09:02


Imaging Impressions Last 24 hrs: 





US abd/RUQ - normal gallbladder and liver





CXR - images personally reviewed - lungs are clear with no mass, infiltrate or 

effusion. 





*Q Meaningful Use (ADM)





- VTE Risk Assess *Q


Each Risk Factor Represents 1 Point: Age 41 - 59 years, Obesity ( BMI > 25 kg/m2

)


Total Score 1 Point Risk Factors: 2


Each Risk Factor Represents 2 Points: None


Total Score 2 Point Risk Factors: 0


Each Risk Factor Represents 3 Points: None


Total Score 3 Point Risk Factors: 0


Each Risk Factor Represents 5 Points: None


Total Score 5 Point Risk Factors: 0


Venous Thromboembolism Risk Factor Score *Q: 2





- Problem List


(1) Nausea and vomiting


SNOMED Code(s): 18022596


   ICD Code: R11.2 - NAUSEA WITH VOMITING, UNSPECIFIED   Status: Acute   

Current Visit: Yes   


Qualifiers: 


   Vomiting type: unspecified   Vomiting Intractability: non-intractable   

Qualified Code(s): R11.2 - Nausea with vomiting, unspecified   





(2) Dehydration


SNOMED Code(s): 19888373


   ICD Code: E86.0 - DEHYDRATION   Status: Acute   Current Visit: Yes   





(3) Methamphetamine abuse


SNOMED Code(s): 483830194


   ICD Code: F15.10 - OTHER STIMULANT ABUSE, UNCOMPLICATED   Status: Suspected 

  Current Visit: Yes   


Problem List Initiated/Reviewed/Updated: Yes


Orders Last 24hrs: 


 Active Orders 24 hr











 Category Date Time Status


 


 Patient Status Manage Transfer [TRANSFER] Routine ADT  02/14/19 13:38 Ordered


 


 Sodium Chloride 0.9% [Normal Saline] 1,000 ml Med  02/14/19 09:00 Active





 IV ASDIRECTED   


 


 Sodium Chloride 0.9% [Normal Saline] 1,000 ml Med  02/14/19 10:00 Active





 IV ASDIRECTED   


 


 Sodium Chloride 0.9% [Normal Saline] 1,000 ml Med  02/14/19 11:30 Active





 IV ASDIRECTED   


 


 Resuscitation Status Routine Resus Stat  02/14/19 13:40 Ordered








 Medication Orders





Sodium Chloride (Normal Saline)  1,000 mls @ 999 mls/hr IV ASDIRECTED [facility]


   Last Admin: 02/14/19 09:17  Dose: 999 mls/hr


Sodium Chloride (Normal Saline)  1,000 mls @ 999 mls/hr IV ASDIRECTED [facility]


   Last Admin: 02/14/19 10:14  Dose: 999 mls/hr


Sodium Chloride (Normal Saline)  1,000 mls @ 250 mls/hr IV ASDIRECTED [facility]


   Last Admin: 02/14/19 12:04  Dose: 250 mls/hr








Assessment/Plan Comment:: 





ASSESSMENT AND PLAN - 





Persistent nausea with vomiting and epigastric pain - there is evidence for 

moderate dehydration on laboratory studies area examination wise he is doing 

well after 3 L of fluid. Vital signs are stable. There are no fevers. 

Examination is relatively benign as far as abdominal pain. He did have 

gallbladder imaging which was unremarkable. Exact cause for his symptoms is 

unclear. No obvious evidence for infection. Methamphetamine abuse could be 

contributing.


-Additional hydration overnight


-Symptomatic management


-Proton pump inhibitor


-Clear liquids


-Consider EGD if persistent vomiting





Dehydration, moderate - elevated specific gravity as well as ketones in the 

urine. BUN is elevated but creatinine is normal.


-Management as above





Suspected methamphetamine abuse - patient adamantly denies using 

methamphetamine but urine was positive.





Maintenance issues - 


- DVT prophylaxis - mechanical


- GI prophylaxis - PPI


- Nutrition - clear liquids, advance if tolerated


- Gerardo catheter - not indicated





CODE STATUS - full code





Admission justification - patient will be referred observation status for 

hydration and symptomatic management





Disposition - I would anticipate discharge to home tomorrow





Primary care physician - Dr. Wang Jung M.D.

## 2019-02-15 VITALS — DIASTOLIC BLOOD PRESSURE: 79 MMHG | SYSTOLIC BLOOD PRESSURE: 113 MMHG

## 2019-02-15 NOTE — PCM.DCSUM1
**Discharge Summary





- Hospital Course


Brief History: Mr. Barba is a 58-year-old gentleman who was admitted to 

observation status through the emergency department with dehydration, nausea 

and vomiting, secondary to viral gastroenteritis.





- Discharge Data


Discharge Date: 02/15/19


Discharge Disposition: Home, Self-Care 01


Condition: Fair





- Discharge Diagnosis/Problem(s)


(1) Dehydration


SNOMED Code(s): 96990939


   ICD Code: E86.0 - DEHYDRATION   Status: Acute   Current Visit: Yes   





(2) Persistent vomiting


SNOMED Code(s): 279426134


   ICD Code: R11.10 - VOMITING, UNSPECIFIED   Status: Acute   Current Visit: 

Yes   





(3) Methamphetamine abuse


SNOMED Code(s): 974294113


   ICD Code: F15.10 - OTHER STIMULANT ABUSE, UNCOMPLICATED   Status: Suspected 

  Current Visit: Yes   





(4) Viral gastroenteritis


SNOMED Code(s): 183702042


   ICD Code: A08.4 - VIRAL INTESTINAL INFECTION, UNSPECIFIED   Status: Acute   

Current Visit: Yes   





- Patient Summary/Data


Hospital Course: 








Ousmane presentedto the emergency room with a chief complaint of nausea and 

vomiting. He reports 5 days of having difficulty keeping anything down. He 

reports that he has vomited up pills when he tries to take them, liquids, 

crackers and pretty much anything he tries to take in by mouth. He does report 

some epigastric abdominal pain which he describes as sharp and moderate in 

nature. The pain does not radiate. He has not taken anything to make it feel 

better. It does seem to get a little bit worse when he vomits. He reports 

subjective fevers but has not measured any temperatures. No complaints of 

diarrhea. He does not feel short of breath and has not been coughing. No sick 

contacts or travel. No new medications. When asked about the methamphetamines 

in his drug screen he adamantly denies using any illicit drugs. Workup in the 

emergency room was suggestive of dehydration. He did receive 3 L of fluid in 

the emergency room. He has not had any vomiting in the emergency room. He will 

be admitted for additional hydration and symptom management.





He was given IV fluids for hydration as well as medication as needed for 

nausea. By the following day he was feeling significantly improved with no 

persistent nausea or vomiting. He tolerated a regular diet prior to discharge 

without any symptoms. He will be discharged home with Zofran as needed as well 

as a month's worth of Protonix 40 mg daily. Activity will be as tolerated and 

he will resume his usual diet. follow-up appointment will be scheduled with his 

primary care provider within one week.





- Patient Instructions


Diet: Usual Diet as Tolerated


Activity: As Tolerated





- Discharge Plan


*PRESCRIPTION DRUG MONITORING PROGRAM REVIEWED*: Not Applicable


*COPY OF PRESCRIPTION DRUG MONITORING REPORT IN PATIENT OCTAVIA: Not Applicable


Prescriptions/Med Rec: 


Ondansetron [Zofran ODT] 4 mg PO Q6H PRN #10 tab.dis


 PRN Reason: Nausea


Pantoprazole Sodium [Protonix] 40 mg PO DAILY #30 tablet.dr


Home Medications: 


 Home Meds





Mirtazapine [Remeron] 15 mg PO BEDTIME 09/17/14 [History]


Isosorbide Mononitrate [Isosorbide Mononitrate ER] 120 mg PO DAILY 02/28/15 [

History]


Prazosin [Minpress] 2 mg PO BID 06/20/18 [History]


lamoTRIgine 200 mg PO BID 08/14/18 [History]


QUEtiapine [SEROquel] 25 mg PO BID PRN 08/19/18 [History]


Albuterol Sulfate 2 puff IH QID 11/09/18 [History]


Albuterol Sulfate 3 ml IH Q4HR PRN 11/09/18 [History]


Aspirin [Halfprin] 81 mg PO DAILY 11/09/18 [History]


Furosemide 40 mg PO DAILY 11/09/18 [History]


Lisinopril 2.5 mg PO DAILY 11/09/18 [History]


Metoprolol Succinate 100 mg PO BID 11/09/18 [History]


Nitroglycerin 0.4 mg PO ASDIRECTED PRN 11/09/18 [History]


Vilazodone [Viibryd] 40 mg PO DAILY 11/09/18 [History]


atorvaSTATin Calcium [Atorvastatin Calcium] 40 mg PO DAILY 11/09/18 [History]


Carbidopa/Levodopa [Carbidopa-Levodopa  Tab] 1 tab PO QID 12/22/18 [

History]


Gabapentin [Neurontin] 2 tab PO BEDTIME 12/22/18 [History]


Meclizine HCl 25 mg PO DAILY 01/03/19 [History]


clonazePAM [Klonopin] 1 mg PO BEDTIME 01/03/19 [History]


QUEtiapine Fumarate [Quetiapine Fumarate] 300 mg PO BEDTIME 02/14/19 [History]


Ondansetron [Zofran ODT] 4 mg PO Q6H PRN #10 tab.dis 02/15/19 [Rx]


Pantoprazole Sodium [Protonix] 40 mg PO DAILY #30 tablet. 02/15/19 [Rx]








Referrals: 


Ezequiel Piña MD [Primary Care Provider] - 02/19/19 4:00 pm (Please arrive 15 

minutes early to register for your appointment.)





- Discharge Summary/Plan Comment


DC Time >30 min.: No





- Patient Data


Vitals - Most Recent: 


 Last Vital Signs











Temp  96.2 F   02/15/19 11:42


 


Pulse  78   02/15/19 11:45


 


Resp  20   02/15/19 11:42


 


BP  116/67   02/15/19 11:47


 


Pulse Ox  96   02/15/19 11:42











Weight - Most Recent: 169 lb 2.007 oz


I&O - Last 24 hours: 


 Intake & Output











 02/14/19 02/15/19 02/15/19





 22:59 06:59 14:59


 


Intake Total   240


 


Output Total 200  


 


Balance -200  240











Lab Results - Last 24 hrs: 


 Laboratory Results - last 24 hr











  02/15/19 02/15/19 Range/Units





  05:30 05:30 


 


WBC  6.6   (4.5-11.0)  K/uL


 


RBC  4.42   (4.30-5.90)  M/uL


 


Hgb  13.8  D   (12.0-15.0)  g/dL


 


Hct  41.0   (40.0-54.0)  %


 


MCV  93   (80-98)  fL


 


MCH  31   (27-31)  pg


 


MCHC  34   (32-36)  %


 


Plt Count  157   (150-400)  K/uL


 


Sodium   142  (140-148)  mmol/L


 


Potassium   3.8  (3.6-5.2)  mmol/L


 


Chloride   107  (100-108)  mmol/L


 


Carbon Dioxide   25  (21-32)  mmol/L


 


Anion Gap   10.2  (5.0-14.0)  mmol/L


 


BUN   8  D  (7-18)  mg/dL


 


Creatinine   0.8  (0.8-1.3)  mg/dL


 


Est Cr Clr Drug Dosing   93.04  mL/min


 


Estimated GFR (MDRD)   > 60  (>60)  


 


Glucose   67 L  ()  mg/dL


 


Calcium   8.7  (8.5-10.1)  mg/dL


 


Total Bilirubin   1.3 H  (0.2-1.0)  mg/dL


 


AST   24  (15-37)  U/L


 


ALT   13  (12-78)  U/L


 


Alkaline Phosphatase   115  ()  U/L


 


Total Protein   6.9  (6.4-8.2)  g/dL


 


Albumin   3.2 L  (3.4-5.0)  g/dL


 


Globulin   3.7 H  (2.3-3.5)  g/dL


 


Albumin/Globulin Ratio   0.9 L  (1.2-2.2)  











Med Orders - Current: 


 Current Medications





Acetaminophen (Tylenol)  650 mg PO Q4H PRN


   PRN Reason: Pain (Mild 1-3)/fever


Albuterol (Proventil Neb Soln)  2.5 mg NEB Q4H PRN


   PRN Reason: Shortness Of Breath/wheezing


Albuterol (Ventolin Hfa)  0 gm INH QIDRT [facility]


   Last Admin: 02/15/19 11:22 Dose:  Not Given


Aspirin (Halfprin)  81 mg PO DAILY [facility]


   Last Admin: 02/15/19 11:47 Dose:  81 mg


Atorvastatin Calcium (Lipitor)  40 mg PO DAILY [facility]


   Last Admin: 02/15/19 11:46 Dose:  40 mg


Carbidopa/Levodopa (Sinemet  Mg)  1 tab PO QID [facility]


   Last Admin: 02/15/19 11:46 Dose:  1 tab


Clonazepam (Klonopin)  1 mg PO BEDTIME [facility]


   Last Admin: 02/14/19 22:31 Dose:  Not Given


Gabapentin (Neurontin)  600 mg PO BEDTIME [facility]


   Last Admin: 02/14/19 22:25 Dose:  600 mg


Isosorbide Mononitrate (Imdur)  120 mg PO DAILY [facility]


   Last Admin: 02/15/19 11:44 Dose:  120 mg


Lamotrigine (Lamotrigine)  200 mg PO BID [facility]


   Last Admin: 02/15/19 11:48 Dose:  200 mg


Lisinopril (Prinivil)  2.5 mg PO DAILY [facility]


   Last Admin: 02/15/19 11:46 Dose:  2.5 mg


Lorazepam (Ativan)  1 mg IV Q6H PRN


   PRN Reason: Nausea/Vomiting


Meclizine HCl (Antivert)  25 mg PO DAILY [facility]


   Last Admin: 02/15/19 11:46 Dose:  25 mg


Metoprolol Succinate (Toprol Xl)  100 mg PO BID [facility]


   Last Admin: 02/15/19 11:45 Dose:  100 mg


Mirtazapine (Remeron)  15 mg PO BEDTIME [facility]


   Last Admin: 02/14/19 22:27 Dose:  15 mg


Ondansetron HCl (Zofran Odt)  4 mg PO Q6H PRN


   PRN Reason: Nausea able to take PO


   Last Admin: 02/14/19 22:38 Dose:  4 mg


Ondansetron HCl (Zofran)  4 mg IV Q6H PRN


   PRN Reason: Nausea/Vomiting


Oxycodone HCl (Oxycodone)  5 mg PO Q4H PRN


   PRN Reason: Pain (moderate 4-6)


Polyethylene Glycol (Miralax)  17 gm PO DAILY PRN


   PRN Reason: Constipation


Prazosin HCl (Minpress)  2 mg PO BID [facility]


   Last Admin: 02/15/19 11:47 Dose:  2 mg


Quetiapine Fumarate (Seroquel)  300 mg PO BEDTIME [facility]


   Last Admin: 02/14/19 22:27 Dose:  300 mg


Quetiapine Fumarate (Seroquel)  25 mg PO BID PRN


   PRN Reason: Anxiety


Senna/Docusate Sodium (Senna Plus)  1 tab PO BID PRN


   PRN Reason: Constipation


Vilazodone HCl (Viibryd)  40 mg PO DAILY [facility]


   Last Admin: 02/15/19 11:45 Dose:  40 mg





Discontinued Medications





Carbidopa/Levodopa (Sinemet  Mg)  1 tab PO ONETIME ONE


   Stop: 02/14/19 11:56


   Last Admin: 02/14/19 12:13 Dose:  1 tab


Sodium Chloride (Normal Saline)  1,000 mls @ 999 mls/hr IV ASDIRECTED [facility]


   Last Admin: 02/14/19 09:17 Dose:  999 mls/hr


Sodium Chloride (Normal Saline)  1,000 mls @ 999 mls/hr IV ASDIRECTED [facility]


   Last Admin: 02/14/19 10:14 Dose:  999 mls/hr


Sodium Chloride (Normal Saline)  1,000 mls @ 250 mls/hr IV ASDIRECTED [facility]


   Last Admin: 02/14/19 12:04 Dose:  250 mls/hr


Sodium Chloride (Normal Saline)  1,000 mls @ 100 mls/hr IV ASDIRECTED [facility]


   Stop: 02/15/19 00:26


   Last Admin: 02/14/19 14:57 Dose:  100 mls/hr


Lorazepam (Ativan)  1 mg IVPUSH ONETIME ONE


   Stop: 02/14/19 08:57


   Last Admin: 02/14/19 09:12 Dose:  1 mg


Lorazepam (Ativan)  0.5 mg IVPUSH ONETIME ONE


   Stop: 02/14/19 11:56


   Last Admin: 02/14/19 12:16 Dose:  0.5 mg


Ondansetron HCl (Zofran)  4 mg IVPUSH ONETIME ONE


   Stop: 02/14/19 08:56


   Last Admin: 02/14/19 09:13 Dose:  4 mg


Pantoprazole Sodium (Protonix Iv***)  40 mg IVPUSH ONETIME ONE


   Stop: 02/14/19 16:01


   Last Admin: 02/14/19 16:05 Dose:  40 mg


Prochlorperazine Edisylate (Compazine)  10 mg IVPUSH ONETIME ONE


   Stop: 02/14/19 13:05


   Last Admin: 02/14/19 13:11 Dose:  10 mg











- Exam


General: Reports: Alert, Oriented, Cooperative, No Acute Distress


Lungs: Reports: Clear to Auscultation, Normal Respiratory Effort


Cardiovascular: Reports: Regular Rate, Regular Rhythm, No Murmurs


GI/Abdominal Exam: Soft, Non-Tender, No Organomegaly, No Distention

## 2019-02-27 ENCOUNTER — HOSPITAL ENCOUNTER (EMERGENCY)
Dept: HOSPITAL 11 - JP.ED | Age: 59
Discharge: HOME | End: 2019-02-27
Payer: MEDICAID

## 2019-02-27 VITALS — DIASTOLIC BLOOD PRESSURE: 39 MMHG | SYSTOLIC BLOOD PRESSURE: 81 MMHG

## 2019-02-27 DIAGNOSIS — Z79.899: ICD-10-CM

## 2019-02-27 DIAGNOSIS — I25.10: ICD-10-CM

## 2019-02-27 DIAGNOSIS — I10: ICD-10-CM

## 2019-02-27 DIAGNOSIS — Z95.5: ICD-10-CM

## 2019-02-27 DIAGNOSIS — Z95.0: ICD-10-CM

## 2019-02-27 DIAGNOSIS — F32.9: ICD-10-CM

## 2019-02-27 DIAGNOSIS — F41.9: ICD-10-CM

## 2019-02-27 DIAGNOSIS — I95.2: Primary | ICD-10-CM

## 2019-02-27 DIAGNOSIS — E78.00: ICD-10-CM

## 2019-02-27 PROCEDURE — 99283 EMERGENCY DEPT VISIT LOW MDM: CPT

## 2019-02-27 PROCEDURE — 96360 HYDRATION IV INFUSION INIT: CPT

## 2019-02-27 NOTE — EDM.PDOC
ED HPI GENERAL MEDICAL PROBLEM





- General


Chief Complaint: Syncope


Stated Complaint: via north


Time Seen by Provider: 02/27/19 13:25


Source of Information: Reports: Patient, EMS


History Limitations: Reports: No Limitations





- History of Present Illness


INITIAL COMMENTS - FREE TEXT/NARRATIVE: 





Hypotension, near syncopal episodes today at home. This is a repeat recurring 

problem for Chase believed to be related to his medications. Today he got 

very dizzy when standing up from a sitting position so called the ambulance, 

his blood pressure was normal when they arrived but when they stood him up his 

systolic dropped to 65 and he became lightheaded. He is not bleeding, feverish, 

nausea or vomiting or diarrhea. He does not want to be in the hospital. I have 

seen him for this several times, workups have been negative other than volume 

contraction and possibly overmedication.


Onset: Unknown/Unsure


Associated Symptoms: Reports: Malaise, Weakness.  Denies: Chest Pain, Cough, 

Headaches, Loss of Appetite, Shortness of Breath





- Related Data


 Allergies











Allergy/AdvReac Type Severity Reaction Status Date / Time


 


No Known Allergies Allergy   Verified 02/14/19 08:31











Home Meds: 


 Home Meds





Mirtazapine [Remeron] 15 mg PO BEDTIME 09/17/14 [History]


Isosorbide Mononitrate [Isosorbide Mononitrate ER] 120 mg PO DAILY 02/28/15 [

History]


Prazosin [Minpress] 2 mg PO BID 06/20/18 [History]


lamoTRIgine 200 mg PO BID 08/14/18 [History]


QUEtiapine [SEROquel] 25 mg PO BID PRN 08/19/18 [History]


Albuterol Sulfate 2 puff IH QID 11/09/18 [History]


Albuterol Sulfate 3 ml IH Q4HR PRN 11/09/18 [History]


Aspirin [Halfprin] 81 mg PO DAILY 11/09/18 [History]


Furosemide 40 mg PO DAILY 11/09/18 [History]


Lisinopril 2.5 mg PO DAILY 11/09/18 [History]


Metoprolol Succinate 100 mg PO BID 11/09/18 [History]


Nitroglycerin 0.4 mg PO ASDIRECTED PRN 11/09/18 [History]


Vilazodone [Viibryd] 40 mg PO DAILY 11/09/18 [History]


atorvaSTATin Calcium [Atorvastatin Calcium] 40 mg PO DAILY 11/09/18 [History]


Carbidopa/Levodopa [Carbidopa-Levodopa  Tab] 1 tab PO QID 12/22/18 [

History]


Gabapentin [Neurontin] 2 tab PO BEDTIME 12/22/18 [History]


Meclizine HCl 25 mg PO DAILY 01/03/19 [History]


clonazePAM [Klonopin] 1 mg PO BEDTIME 01/03/19 [History]


QUEtiapine Fumarate [Quetiapine Fumarate] 300 mg PO BEDTIME 02/14/19 [History]


Ondansetron [Zofran ODT] 4 mg PO Q6H PRN #10 tab.dis 02/15/19 [Rx]


Pantoprazole Sodium [Protonix] 40 mg PO DAILY #30 tablet. 02/15/19 [Rx]











Past Medical History


HEENT History: Reports: Impaired Vision


Other HEENT History: prosthetic eye right


Cardiovascular History: Reports: CAD, High Cholesterol, Hypertension, MI, 

Pacemaker, SOB on Exertion, Stents


Gastrointestinal History: Reports: GERD


Neurological History: Reports: CVA, Parkinson's


Psychiatric History: Reports: Anxiety, Bipolar, Depression, Mood Swings, PTSD, 

Suicide Attempt





- Infectious Disease History


Infectious Disease History: Reports: Chicken Pox


Other Infectious Disease History: unable to obtain





- Past Surgical History


HEENT Surgical History: Reports: Eye Surgery


Cardiovascular Surgical History: Reports: AICD, Coronary Artery Stent, Other (

See Below)


Other Cardiovascular Surgeries/Procedures: pacer defibrillator


GI Surgical History: Reports: EGD, Hernia, Abdominal





Social & Family History





- Family History


Family Medical History: Noncontributory


Cardiac: Reports: CAD





- Tobacco Use


Smoking Status *Q: Unknown Ever Smoked





- Caffeine Use


Caffeine Use: Reports: None


Other Caffeine Use: "seldom"





- Recreational Drug Use


Recreational Drug Use: No





ED ROS GENERAL





- Review of Systems


Review Of Systems: See Below


Constitutional: Reports: Malaise.  Denies: Fever, Chills


HEENT: Reports: Other (Chronic blindness right eye)


Respiratory: Denies: Shortness of Breath


Cardiovascular: Denies: Chest Pain, Palpitations


GI/Abdominal: Denies: Abdominal Pain, Nausea, Vomiting


: Reports: No Symptoms


Skin: Reports: No Symptoms


Neurological: Reports: Syncope, Weakness





- Physical Exam


Exam: See Below


Exam Limited By: No Limitations


General Appearance: Alert, No Apparent Distress


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 

mobility of the right eye)


Head Exam: Atraumatic


Respiratory/Chest: No Respiratory Distress, Lungs Clear


Cardiovascular: Regular Rate, Rhythm


Neuro Exam (Abbreviated): Alert, Oriented


Extremities: No: Pedal Edema


Psychiatric: Flat Affect


Skin Exam: Warm, Dry





Course





- Vital Signs


Last Recorded V/S: 


 Last Vital Signs











Temp  98.6 F   02/27/19 13:33


 


Pulse  64   02/27/19 14:28


 


Resp  16   02/27/19 14:28


 


BP  81/39 L  02/27/19 14:28


 


Pulse Ox  95   02/27/19 14:28














- Orders/Labs/Meds


Meds: 


Medications














Discontinued Medications














Generic Name Dose Route Start Last Admin





  Trade Name Marly  PRN Reason Stop Dose Admin


 


Sodium Chloride  1,000 mls @ 1,000 mls/hr  02/27/19 14:00  02/27/19 13:50





  Normal Saline  IV   1,000 mls/hr





  ASDIRECTED ELLIE   Administration





     





     





     





     














- Re-Assessments/Exams


Free Text/Narrative Re-Assessment/Exam: 





02/27/19 13:50


An IV was started and the patient was bolused with 1 L of normal saline.


02/27/19 14:59


After liter normal saline, the patient was asymptomatic when standing and his 

systolic blood pressure was near 90. More fluids were offered but he wanted to 

go home. He is going to go in and visit with his primary doctor in the next few 

days about his blood pressure medications.





Departure





- Departure


Time of Disposition: 15:06


Disposition: Home, Self-Care 01


Condition: Fair


Clinical Impression: 


Hypotension


Qualifiers:


 Hypotension type: hypotension due to drug Qualified Code(s): I95.2 - 

Hypotension due to drugs








- Discharge Information


Instructions:  Hypotension, Easy-to-Read


Referrals: 


PCP,None [Primary Care Provider] - 


Forms:  ED Department Discharge


Care Plan Goals: 


Discuss your medications with Dr. Piña in the near future to see if any 

changes would be helpful.

## 2019-04-12 ENCOUNTER — HOSPITAL ENCOUNTER (EMERGENCY)
Dept: HOSPITAL 11 - JP.ED | Age: 59
Discharge: HOME | End: 2019-04-12
Payer: MEDICAID

## 2019-04-12 VITALS — SYSTOLIC BLOOD PRESSURE: 148 MMHG | DIASTOLIC BLOOD PRESSURE: 98 MMHG

## 2019-04-12 DIAGNOSIS — I20.9: Primary | ICD-10-CM

## 2019-04-12 DIAGNOSIS — F41.9: ICD-10-CM

## 2019-04-12 DIAGNOSIS — I10: ICD-10-CM

## 2019-04-12 DIAGNOSIS — Z79.899: ICD-10-CM

## 2019-04-12 DIAGNOSIS — F31.9: ICD-10-CM

## 2019-04-12 DIAGNOSIS — I25.10: ICD-10-CM

## 2019-04-12 DIAGNOSIS — K21.9: ICD-10-CM

## 2019-04-12 DIAGNOSIS — Z87.891: ICD-10-CM

## 2019-04-12 DIAGNOSIS — I25.2: ICD-10-CM

## 2019-04-12 DIAGNOSIS — G20: ICD-10-CM

## 2019-04-12 PROCEDURE — 82553 CREATINE MB FRACTION: CPT

## 2019-04-12 PROCEDURE — 71045 X-RAY EXAM CHEST 1 VIEW: CPT

## 2019-04-12 PROCEDURE — 96374 THER/PROPH/DIAG INJ IV PUSH: CPT

## 2019-04-12 PROCEDURE — 96375 TX/PRO/DX INJ NEW DRUG ADDON: CPT

## 2019-04-12 PROCEDURE — 80053 COMPREHEN METABOLIC PANEL: CPT

## 2019-04-12 PROCEDURE — 85025 COMPLETE CBC W/AUTO DIFF WBC: CPT

## 2019-04-12 PROCEDURE — 84484 ASSAY OF TROPONIN QUANT: CPT

## 2019-04-12 PROCEDURE — 99285 EMERGENCY DEPT VISIT HI MDM: CPT

## 2019-04-12 PROCEDURE — 36415 COLL VENOUS BLD VENIPUNCTURE: CPT

## 2019-04-12 NOTE — EDM.PDOC
ED HPI GENERAL MEDICAL PROBLEM





- General


Chief Complaint: Chest Pain


Stated Complaint: CHEST PAIN


Time Seen by Provider: 04/12/19 10:12


Source of Information: Reports: Patient, Family, Old Records, RN Notes Reviewed


History Limitations: Reports: No Limitations





- History of Present Illness


INITIAL COMMENTS - FREE TEXT/NARRATIVE: 





59-year-old gentleman presents to the emergency department today via EMS 

services, for the complaint of chest pain, he has known history coronary artery 

disease with defibrillator placement, states he had an event of chest pain 

sudden onset about 7 this morning he took 3 nitroglycerin of which finally gave 

him relief however he did pass out after the third nitroglycerin, he is not 

complaining any any pain he is chest pain-free at this time has not taken any 

aspirin, he did feel short of breath was diaphoretic but no nausea


  ** denies


Pain Score (Numeric/FACES): 0





- Related Data


 Allergies











Allergy/AdvReac Type Severity Reaction Status Date / Time


 


No Known Allergies Allergy   Verified 04/12/19 10:11











Home Meds: 


 Home Meds





Mirtazapine [Remeron] 15 mg PO BEDTIME 09/17/14 [History]


Isosorbide Mononitrate [Isosorbide Mononitrate ER] 120 mg PO DAILY 02/28/15 [

History]


Prazosin [Minpress] 2 mg PO BID 06/20/18 [History]


lamoTRIgine 200 mg PO BID 08/14/18 [History]


QUEtiapine [SEROquel] 25 mg PO BID PRN 08/19/18 [History]


Albuterol Sulfate 2 puff IH QID 11/09/18 [History]


Albuterol Sulfate 3 ml IH Q4HR PRN 11/09/18 [History]


Aspirin [Halfprin] 81 mg PO DAILY 11/09/18 [History]


Furosemide 40 mg PO DAILY 11/09/18 [History]


Lisinopril 2.5 mg PO DAILY 11/09/18 [History]


Metoprolol Succinate 100 mg PO BID 11/09/18 [History]


Nitroglycerin 0.4 mg PO ASDIRECTED PRN 11/09/18 [History]


Vilazodone [Viibryd] 40 mg PO DAILY 11/09/18 [History]


atorvaSTATin Calcium [Atorvastatin Calcium] 40 mg PO DAILY 11/09/18 [History]


Carbidopa/Levodopa [Carbidopa-Levodopa  Tab] 1 tab PO QID 12/22/18 [

History]


Gabapentin [Neurontin] 2 tab PO BEDTIME 12/22/18 [History]


Meclizine HCl 25 mg PO DAILY 01/03/19 [History]


clonazePAM [Klonopin] 1 mg PO BEDTIME 01/03/19 [History]


QUEtiapine Fumarate [Quetiapine Fumarate] 300 mg PO BEDTIME 02/14/19 [History]


Ondansetron [Zofran ODT] 4 mg PO Q6H PRN #10 tab.dis 02/15/19 [Rx]


Pantoprazole Sodium [Protonix] 40 mg PO DAILY #30 tablet.dr 02/15/19 [Rx]











Past Medical History


HEENT History: Reports: Impaired Vision


Other HEENT History: prosthetic eye right


Cardiovascular History: Reports: CAD, High Cholesterol, Hypertension, MI, 

Pacemaker, SOB on Exertion, Stents


Gastrointestinal History: Reports: GERD


Neurological History: Reports: CVA, Parkinson's


Psychiatric History: Reports: Anxiety, Bipolar, Depression, Mood Swings, PTSD, 

Suicide Attempt





- Infectious Disease History


Infectious Disease History: Reports: Chicken Pox


Other Infectious Disease History: unable to obtain





- Past Surgical History


HEENT Surgical History: Reports: Eye Surgery


Cardiovascular Surgical History: Reports: AICD, Coronary Artery Stent, Other (

See Below)


Other Cardiovascular Surgeries/Procedures: pacer defibrillator


GI Surgical History: Reports: EGD, Hernia, Abdominal





Social & Family History





- Family History


Family Medical History: Noncontributory


Cardiac: Reports: CAD





- Tobacco Use


Smoking Status *Q: Former Smoker


Years of Tobacco use: 5


Used Tobacco, but Quit: Yes


Month/Year Tobacco Last Used: 2008


Second Hand Smoke Exposure: No





- Caffeine Use


Caffeine Use: Reports: Soda, Tea


Other Caffeine Use: "seldom"





- Recreational Drug Use


Recreational Drug Use: No





ED ROS GENERAL





- Review of Systems


Review Of Systems: See Below


Constitutional: Reports: Diaphoresis


HEENT: Reports: No Symptoms


Respiratory: Reports: Shortness of Breath


Cardiovascular: Reports: Chest Pain


GI/Abdominal: Reports: No Symptoms


: Reports: No Symptoms


Musculoskeletal: Reports: No Symptoms


Skin: Reports: No Symptoms





ED EXAM, GENERAL





- Physical Exam


Exam: See Below


Exam Limited By: No Limitations


General Appearance: Alert, WD/WN, No Apparent Distress


Neck: Normal Inspection, Supple, Non-Tender, Full Range of Motion


Respiratory/Chest: No Respiratory Distress, Lungs Clear, Normal Breath Sounds, 

No Accessory Muscle Use


Cardiovascular: Regular Rate, Rhythm, No Murmur


GI/Abdominal: Soft, Non-Tender


Extremities: Normal Inspection, Normal Range of Motion, Non-Tender, No Pedal 

Edema





Course





- Vital Signs


Last Recorded V/S: 


 Last Vital Signs











Temp  95.6 F   04/12/19 12:45


 


Pulse  73   04/12/19 12:45


 


Resp  12   04/12/19 12:45


 


BP  153/100 H  04/12/19 12:45


 


Pulse Ox  90 L  04/12/19 12:45














- Orders/Labs/Meds


Orders: 


 Active Orders 24 hr











 Category Date Time Status


 


 Cardiac Monitoring [RC] .As Directed Care  04/12/19 10:29 Active


 


 Peripheral IV Care [RC] .AS DIRECTED Care  04/12/19 12:24 Active


 


 Sodium Chloride 0.9% [Saline Flush] Med  04/12/19 12:24 Active





 10 ml FLUSH ASDIRECTED PRN   


 


 Peripheral IV Insertion Adult [OM.PC] Urgent Oth  04/12/19 12:24 Ordered








 Medication Orders





Sodium Chloride (Saline Flush)  10 ml FLUSH ASDIRECTED PRN


   PRN Reason: Keep Vein Open


   Last Admin: 04/12/19 12:41  Dose: 10 ml








Labs: 


 Laboratory Tests











  04/12/19 04/12/19 04/12/19 Range/Units





  10:39 10:39 12:55 


 


WBC  5.6    (4.5-11.0)  K/uL


 


RBC  4.44    (4.30-5.90)  M/uL


 


Hgb  13.4    (12.0-15.0)  g/dL


 


Hct  42.4    (40.0-54.0)  %


 


MCV  96    (80-98)  fL


 


MCH  30    (27-31)  pg


 


MCHC  32    (32-36)  %


 


Plt Count  220    (150-400)  K/uL


 


Neut % (Auto)  67 H    (36-66)  %


 


Lymph % (Auto)  21 L    (24-44)  %


 


Mono % (Auto)  9 H    (2-6)  %


 


Eos % (Auto)  3    (2-4)  %


 


Baso % (Auto)  0    (0-1)  %


 


Sodium   140   (140-148)  mmol/L


 


Potassium   3.7   (3.6-5.2)  mmol/L


 


Chloride   105   (100-108)  mmol/L


 


Carbon Dioxide   28   (21-32)  mmol/L


 


Anion Gap   7.0   (5.0-14.0)  mmol/L


 


BUN   14  D   (7-18)  mg/dL


 


Creatinine   1.2   (0.8-1.3)  mg/dL


 


Est Cr Clr Drug Dosing   61.29   mL/min


 


Estimated GFR (MDRD)   > 60   (>60)  


 


Glucose   109 H   ()  mg/dL


 


Calcium   9.3   (8.5-10.1)  mg/dL


 


Total Bilirubin   1.1 H   (0.2-1.0)  mg/dL


 


AST   27   (15-37)  U/L


 


ALT   40  D   (12-78)  U/L


 


Alkaline Phosphatase   145 H   ()  U/L


 


CK-MB (CK-2)   0.7   (0-3.6)  mg/mL


 


Troponin I   0.026  0.028  (0.000-0.056)  ng/mL


 


Total Protein   7.6   (6.4-8.2)  g/dL


 


Albumin   3.6   (3.4-5.0)  g/dL


 


Globulin   4.0 H   (2.3-3.5)  g/dL


 


Albumin/Globulin Ratio   0.9 L   (1.2-2.2)  











Meds: 


Medications











Generic Name Dose Route Start Last Admin





  Trade Name Freq  PRN Reason Stop Dose Admin


 


Sodium Chloride  10 ml  04/12/19 12:24  04/12/19 12:41





  Saline Flush  FLUSH   10 ml





  ASDIRECTED PRN   Administration





  Keep Vein Open   





     





     





     














Discontinued Medications














Generic Name Dose Route Start Last Admin





  Trade Name Freq  PRN Reason Stop Dose Admin


 


Aspirin  324 mg  04/12/19 10:31  04/12/19 10:43





  Aspirin  PO  04/12/19 10:32  324 mg





  ONETIME ONE   Administration





     





     





     





     


 


Lorazepam  1 mg  04/12/19 12:24  04/12/19 12:40





  Ativan  IVPUSH  04/12/19 12:25  1 mg





  ONETIME ONE   Administration





     





     





     





     


 


Morphine Sulfate  2 mg  04/12/19 12:30  04/12/19 12:41





  Morphine  IVPUSH  04/12/19 12:31  2 mg





  ONETIME ONE   Administration





     





     





     





     














Departure





- Departure


Time of Disposition: 13:50


Disposition: Home, Self-Care 01


Condition: Fair


Clinical Impression: 


 Angina pectoris





Referrals: 


PCP,None [Primary Care Provider] - 


Forms:  ED Department Discharge


Additional Instructions: 


Resume your regular medications, please follow-up with Dr. Piña in the next 2-

3 days for reevaluation with possible referral to cardiology, call return to 

the emergency department worsening of symptoms





- My Orders


Last 24 Hours: 


My Active Orders





04/12/19 10:29


Cardiac Monitoring [RC] .As Directed 





04/12/19 12:24


Peripheral IV Care [RC] .AS DIRECTED 


Sodium Chloride 0.9% [Saline Flush]   10 ml FLUSH ASDIRECTED PRN 


Peripheral IV Insertion Adult [OM.PC] Urgent 














- Assessment/Plan


Last 24 Hours: 


My Active Orders





04/12/19 10:29


Cardiac Monitoring [RC] .As Directed 





04/12/19 12:24


Peripheral IV Care [RC] .AS DIRECTED 


Sodium Chloride 0.9% [Saline Flush]   10 ml FLUSH ASDIRECTED PRN 


Peripheral IV Insertion Adult [OM.PC] Urgent 











Plan: 





assessment:





angina 


CAD


anxiety








Plan:


Discussed options with him, his troponin remained the same over a six-hour 

period, suspicious he is having ongoing angina from prior myocardial infarction 

and is need of medication adjustment, possibly the mononitrate he would like to 

follow-up with his primary care with referral to cardiology

## 2019-04-12 NOTE — CRLCR
INDICATION:



Chest pain.



TECHNIQUE:



AP chest.



COMPARISON:



February 14, 2019.



FINDINGS:



Left-sided pacemaker/defibrillator with its lead tips in the right atrium 

and ventricle. Coronary artery stent. Overall heart size is toward the 

upper limit of normal accentuated by the technique. Strand of fibrosis left 

mid lung. Clear right lung.



IMPRESSION:



No acute cardiopulmonary process identified. No significant change.



Dictated by Omar Unger MD @ Apr 12 2019 11:03AM



Signed by Dr. Omar Unger @ Apr 12 2019 11:03AM